# Patient Record
Sex: MALE | Race: WHITE | NOT HISPANIC OR LATINO | Employment: FULL TIME | URBAN - METROPOLITAN AREA
[De-identification: names, ages, dates, MRNs, and addresses within clinical notes are randomized per-mention and may not be internally consistent; named-entity substitution may affect disease eponyms.]

---

## 2023-05-12 LAB
ALANINE AMINOTRANSFERASE (SGPT) (U/L) IN SER/PLAS: 16 U/L (ref 10–52)
ALBUMIN (G/DL) IN SER/PLAS: 3.9 G/DL (ref 3.4–5)
ALKALINE PHOSPHATASE (U/L) IN SER/PLAS: 110 U/L (ref 33–120)
AMYLASE (U/L) IN SER/PLAS: 23 U/L (ref 29–103)
ANION GAP IN SER/PLAS: 13 MMOL/L (ref 10–20)
ASPARTATE AMINOTRANSFERASE (SGOT) (U/L) IN SER/PLAS: 19 U/L (ref 9–39)
BASOPHILS (10*3/UL) IN BLOOD BY AUTOMATED COUNT: 0.05 X10E9/L (ref 0–0.1)
BASOPHILS/100 LEUKOCYTES IN BLOOD BY AUTOMATED COUNT: 0.4 % (ref 0–2)
BILIRUBIN TOTAL (MG/DL) IN SER/PLAS: 0.7 MG/DL (ref 0–1.2)
CALCIDIOL (25 OH VITAMIN D3) (NG/ML) IN SER/PLAS: 30 NG/ML
CALCIUM (MG/DL) IN SER/PLAS: 9.2 MG/DL (ref 8.6–10.3)
CARBON DIOXIDE, TOTAL (MMOL/L) IN SER/PLAS: 29 MMOL/L (ref 21–32)
CHLORIDE (MMOL/L) IN SER/PLAS: 103 MMOL/L (ref 98–107)
CHOLESTEROL (MG/DL) IN SER/PLAS: 141 MG/DL (ref 0–199)
CHOLESTEROL IN HDL (MG/DL) IN SER/PLAS: 49.7 MG/DL
CHOLESTEROL/HDL RATIO: 2.8
CREATININE (MG/DL) IN SER/PLAS: 0.77 MG/DL (ref 0.5–1.3)
EOSINOPHILS (10*3/UL) IN BLOOD BY AUTOMATED COUNT: 0.33 X10E9/L (ref 0–0.7)
EOSINOPHILS/100 LEUKOCYTES IN BLOOD BY AUTOMATED COUNT: 2.9 % (ref 0–6)
ERYTHROCYTE DISTRIBUTION WIDTH (RATIO) BY AUTOMATED COUNT: 13.6 % (ref 11.5–14.5)
ERYTHROCYTE MEAN CORPUSCULAR HEMOGLOBIN CONCENTRATION (G/DL) BY AUTOMATED: 33.7 G/DL (ref 32–36)
ERYTHROCYTE MEAN CORPUSCULAR VOLUME (FL) BY AUTOMATED COUNT: 90 FL (ref 80–100)
ERYTHROCYTES (10*6/UL) IN BLOOD BY AUTOMATED COUNT: 5.22 X10E12/L (ref 4.5–5.9)
ESTIMATED AVERAGE GLUCOSE FOR HBA1C: 117 MG/DL
FERRITIN (UG/LL) IN SER/PLAS: 334 UG/L (ref 20–300)
GFR MALE: >90 ML/MIN/1.73M2
GLUCOSE (MG/DL) IN SER/PLAS: 104 MG/DL (ref 74–99)
HEMATOCRIT (%) IN BLOOD BY AUTOMATED COUNT: 46.9 % (ref 41–52)
HEMOGLOBIN (G/DL) IN BLOOD: 15.8 G/DL (ref 13.5–17.5)
HEMOGLOBIN A1C/HEMOGLOBIN TOTAL IN BLOOD: 5.7 %
IMMATURE GRANULOCYTES/100 LEUKOCYTES IN BLOOD BY AUTOMATED COUNT: 0.4 % (ref 0–0.9)
IRON (UG/DL) IN SER/PLAS: 132 UG/DL (ref 35–150)
IRON BINDING CAPACITY (UG/DL) IN SER/PLAS: 291 UG/DL (ref 240–445)
IRON SATURATION (%) IN SER/PLAS: 45 % (ref 25–45)
LDL: 74 MG/DL (ref 0–99)
LEUKOCYTES (10*3/UL) IN BLOOD BY AUTOMATED COUNT: 11.3 X10E9/L (ref 4.4–11.3)
LYMPHOCYTES (10*3/UL) IN BLOOD BY AUTOMATED COUNT: 2.92 X10E9/L (ref 1.2–4.8)
LYMPHOCYTES/100 LEUKOCYTES IN BLOOD BY AUTOMATED COUNT: 25.7 % (ref 13–44)
MONOCYTES (10*3/UL) IN BLOOD BY AUTOMATED COUNT: 0.66 X10E9/L (ref 0.1–1)
MONOCYTES/100 LEUKOCYTES IN BLOOD BY AUTOMATED COUNT: 5.8 % (ref 2–10)
NEUTROPHILS (10*3/UL) IN BLOOD BY AUTOMATED COUNT: 7.34 X10E9/L (ref 1.2–7.7)
NEUTROPHILS/100 LEUKOCYTES IN BLOOD BY AUTOMATED COUNT: 64.8 % (ref 40–80)
PLATELETS (10*3/UL) IN BLOOD AUTOMATED COUNT: 249 X10E9/L (ref 150–450)
POTASSIUM (MMOL/L) IN SER/PLAS: 4.6 MMOL/L (ref 3.5–5.3)
PROSTATE SPECIFIC ANTIGEN,SCREEN: 6.85 NG/ML (ref 0–4)
PROTEIN TOTAL: 6.9 G/DL (ref 6.4–8.2)
SODIUM (MMOL/L) IN SER/PLAS: 140 MMOL/L (ref 136–145)
TRIGLYCERIDE (MG/DL) IN SER/PLAS: 89 MG/DL (ref 0–149)
UREA NITROGEN (MG/DL) IN SER/PLAS: 10 MG/DL (ref 6–23)
VLDL: 18 MG/DL (ref 0–40)

## 2023-05-16 PROBLEM — I10 HYPERTENSION: Status: ACTIVE | Noted: 2023-05-16

## 2023-05-16 PROBLEM — M54.16 LUMBAR RADICULAR PAIN: Status: ACTIVE | Noted: 2023-05-16

## 2023-05-16 PROBLEM — E11.9 TYPE 2 DIABETES MELLITUS (MULTI): Status: ACTIVE | Noted: 2023-05-16

## 2023-05-16 PROBLEM — E11.9 DIABETES (MULTI): Status: ACTIVE | Noted: 2023-05-16

## 2023-05-16 PROBLEM — I87.2 CHRONIC VENOUS STASIS DERMATITIS: Status: ACTIVE | Noted: 2023-05-16

## 2023-05-16 PROBLEM — I20.9 ANGINA PECTORIS (CMS-HCC): Status: ACTIVE | Noted: 2023-05-16

## 2023-05-16 PROBLEM — M54.50 LOW BACK PAIN: Status: ACTIVE | Noted: 2023-05-16

## 2023-05-16 PROBLEM — E78.5 HYPERLIPEMIA: Status: ACTIVE | Noted: 2023-05-16

## 2023-05-16 PROBLEM — J44.9 CHRONIC OBSTRUCTIVE PULMONARY DISEASE (MULTI): Status: ACTIVE | Noted: 2023-05-16

## 2023-05-16 PROBLEM — N52.9 MALE ERECTILE DISORDER OF ORGANIC ORIGIN: Status: ACTIVE | Noted: 2023-05-16

## 2023-05-16 PROBLEM — G47.33 OSA ON CPAP: Status: ACTIVE | Noted: 2023-05-16

## 2023-05-16 PROBLEM — E55.9 VITAMIN D DEFICIENCY: Status: ACTIVE | Noted: 2023-05-16

## 2023-05-16 PROBLEM — R97.20 ELEVATED PSA: Status: ACTIVE | Noted: 2023-05-16

## 2023-05-16 LAB — VITAMIN B1, WHOLE BLOOD: 241 NMOL/L (ref 70–180)

## 2023-05-16 RX ORDER — ATORVASTATIN CALCIUM 20 MG/1
1 TABLET, FILM COATED ORAL DAILY
COMMUNITY
End: 2023-05-17 | Stop reason: SDUPTHER

## 2023-05-16 RX ORDER — LISINOPRIL 40 MG/1
1 TABLET ORAL DAILY
COMMUNITY
Start: 2022-02-28 | End: 2023-05-17 | Stop reason: SDUPTHER

## 2023-05-16 RX ORDER — MULTIVITAMIN
TABLET ORAL
COMMUNITY

## 2023-05-16 RX ORDER — SILDENAFIL 50 MG/1
TABLET, FILM COATED ORAL
COMMUNITY
Start: 2019-10-30 | End: 2023-05-17 | Stop reason: SDUPTHER

## 2023-05-16 RX ORDER — PANTOPRAZOLE SODIUM 40 MG/1
TABLET, DELAYED RELEASE ORAL
Status: ON HOLD | COMMUNITY
End: 2023-10-10 | Stop reason: ALTCHOICE

## 2023-05-16 RX ORDER — BECLOMETHASONE DIPROPIONATE HFA 80 UG/1
AEROSOL, METERED RESPIRATORY (INHALATION)
Status: ON HOLD | COMMUNITY
Start: 2022-09-14 | End: 2023-10-10 | Stop reason: ALTCHOICE

## 2023-05-16 RX ORDER — ALBUTEROL SULFATE 90 UG/1
2 AEROSOL, METERED RESPIRATORY (INHALATION) EVERY 4 HOURS PRN
Status: ON HOLD | COMMUNITY
Start: 2022-09-14 | End: 2023-10-10 | Stop reason: ALTCHOICE

## 2023-05-16 RX ORDER — FLUOCINONIDE 0.5 MG/G
OINTMENT TOPICAL 2 TIMES DAILY
Status: ON HOLD | COMMUNITY
Start: 2022-04-27 | End: 2023-10-10 | Stop reason: ALTCHOICE

## 2023-05-17 ENCOUNTER — OFFICE VISIT (OUTPATIENT)
Dept: PRIMARY CARE | Facility: CLINIC | Age: 53
End: 2023-05-17
Payer: COMMERCIAL

## 2023-05-17 VITALS
RESPIRATION RATE: 16 BRPM | HEIGHT: 72 IN | SYSTOLIC BLOOD PRESSURE: 144 MMHG | DIASTOLIC BLOOD PRESSURE: 84 MMHG | HEART RATE: 78 BPM | TEMPERATURE: 98 F | WEIGHT: 304 LBS | BODY MASS INDEX: 41.17 KG/M2

## 2023-05-17 DIAGNOSIS — E78.2 MIXED HYPERLIPIDEMIA: Primary | ICD-10-CM

## 2023-05-17 DIAGNOSIS — G47.33 OSA ON CPAP: ICD-10-CM

## 2023-05-17 DIAGNOSIS — E55.9 VITAMIN D DEFICIENCY: ICD-10-CM

## 2023-05-17 DIAGNOSIS — R97.20 ELEVATED PSA: ICD-10-CM

## 2023-05-17 DIAGNOSIS — I10 PRIMARY HYPERTENSION: ICD-10-CM

## 2023-05-17 DIAGNOSIS — N52.9 ERECTILE DYSFUNCTION, UNSPECIFIED ERECTILE DYSFUNCTION TYPE: ICD-10-CM

## 2023-05-17 DIAGNOSIS — E11.69 TYPE 2 DIABETES MELLITUS WITH OTHER SPECIFIED COMPLICATION, UNSPECIFIED WHETHER LONG TERM INSULIN USE (MULTI): ICD-10-CM

## 2023-05-17 PROCEDURE — 3044F HG A1C LEVEL LT 7.0%: CPT | Performed by: FAMILY MEDICINE

## 2023-05-17 PROCEDURE — 4010F ACE/ARB THERAPY RXD/TAKEN: CPT | Performed by: FAMILY MEDICINE

## 2023-05-17 PROCEDURE — 3077F SYST BP >= 140 MM HG: CPT | Performed by: FAMILY MEDICINE

## 2023-05-17 PROCEDURE — 99214 OFFICE O/P EST MOD 30 MIN: CPT | Performed by: FAMILY MEDICINE

## 2023-05-17 PROCEDURE — 1036F TOBACCO NON-USER: CPT | Performed by: FAMILY MEDICINE

## 2023-05-17 PROCEDURE — 3079F DIAST BP 80-89 MM HG: CPT | Performed by: FAMILY MEDICINE

## 2023-05-17 RX ORDER — ATORVASTATIN CALCIUM 20 MG/1
20 TABLET, FILM COATED ORAL DAILY
Qty: 90 TABLET | Refills: 3 | Status: SHIPPED | OUTPATIENT
Start: 2023-05-17 | End: 2024-01-19 | Stop reason: SDUPTHER

## 2023-05-17 RX ORDER — SILDENAFIL 50 MG/1
50 TABLET, FILM COATED ORAL
Qty: 10 TABLET | Refills: 3 | Status: SHIPPED | OUTPATIENT
Start: 2023-05-17 | End: 2023-10-10 | Stop reason: HOSPADM

## 2023-05-17 RX ORDER — LISINOPRIL 40 MG/1
40 TABLET ORAL DAILY
Qty: 90 TABLET | Refills: 3 | Status: SHIPPED | OUTPATIENT
Start: 2023-05-17 | End: 2024-01-19 | Stop reason: SDUPTHER

## 2023-05-17 RX ORDER — HYDROCHLOROTHIAZIDE 12.5 MG/1
12.5 TABLET ORAL DAILY
Qty: 90 TABLET | Refills: 3 | Status: SHIPPED | OUTPATIENT
Start: 2023-05-17 | End: 2024-01-19 | Stop reason: SDUPTHER

## 2023-05-17 ASSESSMENT — PATIENT HEALTH QUESTIONNAIRE - PHQ9
1. LITTLE INTEREST OR PLEASURE IN DOING THINGS: NOT AT ALL
2. FEELING DOWN, DEPRESSED OR HOPELESS: NOT AT ALL
SUM OF ALL RESPONSES TO PHQ9 QUESTIONS 1 AND 2: 0

## 2023-05-17 ASSESSMENT — ENCOUNTER SYMPTOMS: HYPERTENSION: 1

## 2023-05-17 NOTE — PROGRESS NOTES
Iglesia Rojo is a 53 y.o. male here today for   Chief Complaint   Patient presents with    Hypertension    Hyperlipidemia   BP at home 5/7-5/16: 142-164/79-95     Hypertension  This is a chronic problem. The current episode started more than 1 year ago. The problem has been gradually worsening since onset.   Hyperlipidemia  This is a chronic problem. The current episode started more than 1 year ago.          Objective    Visit Vitals  /84   Pulse 78   Temp 36.7 °C (98 °F)   Resp 16     Body mass index is 41.23 kg/m².   Lab Results   Component Value Date    GLUCOSE 104 (H) 05/12/2023    CALCIUM 9.2 05/12/2023     05/12/2023    K 4.6 05/12/2023    CO2 29 05/12/2023     05/12/2023    BUN 10 05/12/2023    CREATININE 0.77 05/12/2023      Lab Results   Component Value Date    WBC 11.3 05/12/2023    HGB 15.8 05/12/2023    HCT 46.9 05/12/2023    MCV 90 05/12/2023     05/12/2023        Physical Exam  Vitals reviewed.   Constitutional:       General: He is not in acute distress.     Appearance: Normal appearance.   HENT:      Head: Normocephalic and atraumatic.   Cardiovascular:      Rate and Rhythm: Normal rate and regular rhythm.      Heart sounds: No murmur heard.  Pulmonary:      Effort: Pulmonary effort is normal.      Breath sounds: No wheezing, rhonchi or rales.   Musculoskeletal:      Right lower leg: No edema.      Left lower leg: No edema.   Neurological:      Mental Status: He is alert.   Psychiatric:         Mood and Affect: Mood normal.         Behavior: Behavior normal.            Assessment    1. Mixed hyperlipidemia  atorvastatin (Lipitor) 20 mg tablet      2. GOLDY on CPAP        3. Primary hypertension  hydroCHLOROthiazide (HYDRODiuril) 12.5 mg tablet, lisinopril 40 mg tablet      4. Type 2 diabetes mellitus with other specified complication, unspecified whether long term insulin use (CMS/[facility])        5. Vitamin D deficiency        6. Elevated PSA  Referral to Urology      7.  Erectile dysfunction, unspecified erectile dysfunction type  sildenafil (Viagra) 50 mg tablet         Added hydrochlorothiazide  Patient advised to follow up in 6 moas needed or if any concerns arise.  Medication risks and benefits discussed.

## 2023-08-21 LAB
PROSTATE SPECIFIC AG (NG/ML) IN SER/PLAS: 5.9 NG/ML (ref 0–4)
PROSTATE SPECIFIC AG FREE (NG/ML) IN SER/PLAS: 0.5 NG/ML
PROSTATE SPECIFIC AG FREE/PROSTATE SPECIFIC AG TOTAL IN SER/PLAS: 8 %

## 2023-10-08 ENCOUNTER — PREP FOR PROCEDURE (OUTPATIENT)
Dept: OPERATING ROOM | Facility: CLINIC | Age: 53
End: 2023-10-08
Payer: COMMERCIAL

## 2023-10-08 DIAGNOSIS — R97.20 ELEVATED PSA: Primary | ICD-10-CM

## 2023-10-08 RX ORDER — CEFAZOLIN SODIUM 2 G/100ML
2 INJECTION, SOLUTION INTRAVENOUS EVERY 8 HOURS
Status: CANCELLED | OUTPATIENT
Start: 2023-10-08

## 2023-10-08 RX ORDER — SODIUM CHLORIDE 9 MG/ML
100 INJECTION, SOLUTION INTRAVENOUS CONTINUOUS
Status: CANCELLED | OUTPATIENT
Start: 2023-10-08

## 2023-10-10 ENCOUNTER — HOSPITAL ENCOUNTER (OUTPATIENT)
Facility: CLINIC | Age: 53
Setting detail: OUTPATIENT SURGERY
Discharge: HOME | End: 2023-10-10
Attending: OPHTHALMOLOGY | Admitting: OPHTHALMOLOGY
Payer: COMMERCIAL

## 2023-10-10 ENCOUNTER — ANESTHESIA EVENT (OUTPATIENT)
Dept: OPERATING ROOM | Facility: CLINIC | Age: 53
End: 2023-10-10
Payer: COMMERCIAL

## 2023-10-10 ENCOUNTER — ANESTHESIA (OUTPATIENT)
Dept: OPERATING ROOM | Facility: CLINIC | Age: 53
End: 2023-10-10
Payer: COMMERCIAL

## 2023-10-10 VITALS
SYSTOLIC BLOOD PRESSURE: 136 MMHG | BODY MASS INDEX: 39.83 KG/M2 | RESPIRATION RATE: 16 BRPM | HEART RATE: 66 BPM | TEMPERATURE: 96.8 F | HEIGHT: 72 IN | DIASTOLIC BLOOD PRESSURE: 62 MMHG | OXYGEN SATURATION: 100 % | WEIGHT: 294.09 LBS

## 2023-10-10 DIAGNOSIS — R97.20 ELEVATED PROSTATE SPECIFIC ANTIGEN (PSA): ICD-10-CM

## 2023-10-10 DIAGNOSIS — R97.20 ELEVATED PSA: ICD-10-CM

## 2023-10-10 PROBLEM — K21.9 GASTROESOPHAGEAL REFLUX DISEASE WITHOUT ESOPHAGITIS: Status: ACTIVE | Noted: 2023-10-10

## 2023-10-10 PROCEDURE — 2580000001 HC RX 258 IV SOLUTIONS

## 2023-10-10 PROCEDURE — 3700000001 HC GENERAL ANESTHESIA TIME - INITIAL BASE CHARGE: Performed by: UROLOGY

## 2023-10-10 PROCEDURE — 2500000004 HC RX 250 GENERAL PHARMACY W/ HCPCS (ALT 636 FOR OP/ED)

## 2023-10-10 PROCEDURE — 88344 IMHCHEM/IMCYTCHM EA MLT ANTB: CPT | Mod: TC,ELYLAB | Performed by: UROLOGY

## 2023-10-10 PROCEDURE — 88305 TISSUE EXAM BY PATHOLOGIST: CPT | Mod: TC,SUR,CMCLAB,ELYLAB | Performed by: UROLOGY

## 2023-10-10 PROCEDURE — 88344 IMHCHEM/IMCYTCHM EA MLT ANTB: CPT | Performed by: PATHOLOGY

## 2023-10-10 PROCEDURE — 88305 TISSUE EXAM BY PATHOLOGIST: CPT | Mod: TC,ELYLAB | Performed by: UROLOGY

## 2023-10-10 PROCEDURE — 3600000007 HC OR TIME - EACH INCREMENTAL 1 MINUTE - PROCEDURE LEVEL TWO: Performed by: UROLOGY

## 2023-10-10 PROCEDURE — 3700000002 HC GENERAL ANESTHESIA TIME - EACH INCREMENTAL 1 MINUTE: Performed by: UROLOGY

## 2023-10-10 PROCEDURE — 3600000002 HC OR TIME - INITIAL BASE CHARGE - PROCEDURE LEVEL TWO: Performed by: UROLOGY

## 2023-10-10 PROCEDURE — 7100000009 HC PHASE TWO TIME - INITIAL BASE CHARGE: Performed by: UROLOGY

## 2023-10-10 PROCEDURE — 2500000005 HC RX 250 GENERAL PHARMACY W/O HCPCS: Performed by: UROLOGY

## 2023-10-10 PROCEDURE — A55700 PR BIOPSY OF PROSTATE,NEEDLE/PUNCH

## 2023-10-10 PROCEDURE — A55700 PR BIOPSY OF PROSTATE,NEEDLE/PUNCH: Performed by: ANESTHESIOLOGY

## 2023-10-10 PROCEDURE — 55700 PR PROSTATE NEEDLE BIOPSY ANY APPROACH: CPT | Performed by: UROLOGY

## 2023-10-10 PROCEDURE — 76942 ECHO GUIDE FOR BIOPSY: CPT | Performed by: UROLOGY

## 2023-10-10 PROCEDURE — 7100000010 HC PHASE TWO TIME - EACH INCREMENTAL 1 MINUTE: Performed by: UROLOGY

## 2023-10-10 PROCEDURE — 88305 TISSUE EXAM BY PATHOLOGIST: CPT | Mod: TC,CMCLAB,ELYLAB | Performed by: UROLOGY

## 2023-10-10 PROCEDURE — G0416 PROSTATE BIOPSY, ANY MTHD: HCPCS | Performed by: PATHOLOGY

## 2023-10-10 RX ORDER — KETOROLAC TROMETHAMINE 30 MG/ML
INJECTION, SOLUTION INTRAMUSCULAR; INTRAVENOUS AS NEEDED
Status: DISCONTINUED | OUTPATIENT
Start: 2023-10-10 | End: 2023-10-10

## 2023-10-10 RX ORDER — FENTANYL CITRATE 50 UG/ML
INJECTION, SOLUTION INTRAMUSCULAR; INTRAVENOUS AS NEEDED
Status: DISCONTINUED | OUTPATIENT
Start: 2023-10-10 | End: 2023-10-10

## 2023-10-10 RX ORDER — PROPOFOL 10 MG/ML
INJECTION, EMULSION INTRAVENOUS AS NEEDED
Status: DISCONTINUED | OUTPATIENT
Start: 2023-10-10 | End: 2023-10-10

## 2023-10-10 RX ORDER — MIDAZOLAM HYDROCHLORIDE 1 MG/ML
INJECTION, SOLUTION INTRAMUSCULAR; INTRAVENOUS AS NEEDED
Status: DISCONTINUED | OUTPATIENT
Start: 2023-10-10 | End: 2023-10-10

## 2023-10-10 RX ORDER — ACETAMINOPHEN 325 MG/1
TABLET ORAL AS NEEDED
Status: DISCONTINUED | OUTPATIENT
Start: 2023-10-10 | End: 2023-10-10

## 2023-10-10 RX ORDER — CEFAZOLIN SODIUM 2 G/100ML
2 INJECTION, SOLUTION INTRAVENOUS EVERY 8 HOURS
Status: DISCONTINUED | OUTPATIENT
Start: 2023-10-10 | End: 2023-10-10 | Stop reason: HOSPADM

## 2023-10-10 RX ORDER — SODIUM CHLORIDE, SODIUM LACTATE, POTASSIUM CHLORIDE, CALCIUM CHLORIDE 600; 310; 30; 20 MG/100ML; MG/100ML; MG/100ML; MG/100ML
100 INJECTION, SOLUTION INTRAVENOUS CONTINUOUS
Status: DISCONTINUED | OUTPATIENT
Start: 2023-10-10 | End: 2023-10-10 | Stop reason: HOSPADM

## 2023-10-10 RX ORDER — LIDOCAINE HYDROCHLORIDE 10 MG/ML
0.1 INJECTION, SOLUTION EPIDURAL; INFILTRATION; INTRACAUDAL; PERINEURAL ONCE
Status: DISCONTINUED | OUTPATIENT
Start: 2023-10-10 | End: 2023-10-10 | Stop reason: HOSPADM

## 2023-10-10 RX ORDER — CEFAZOLIN 1 G/1
INJECTION, POWDER, FOR SOLUTION INTRAVENOUS AS NEEDED
Status: DISCONTINUED | OUTPATIENT
Start: 2023-10-10 | End: 2023-10-10

## 2023-10-10 RX ORDER — BUPIVACAINE HYDROCHLORIDE 7.5 MG/ML
INJECTION, SOLUTION EPIDURAL; RETROBULBAR AS NEEDED
Status: DISCONTINUED | OUTPATIENT
Start: 2023-10-10 | End: 2023-10-10 | Stop reason: HOSPADM

## 2023-10-10 RX ORDER — ONDANSETRON HYDROCHLORIDE 2 MG/ML
4 INJECTION, SOLUTION INTRAVENOUS ONCE AS NEEDED
Status: DISCONTINUED | OUTPATIENT
Start: 2023-10-10 | End: 2023-10-10 | Stop reason: HOSPADM

## 2023-10-10 RX ORDER — ONDANSETRON HYDROCHLORIDE 2 MG/ML
INJECTION, SOLUTION INTRAVENOUS AS NEEDED
Status: DISCONTINUED | OUTPATIENT
Start: 2023-10-10 | End: 2023-10-10

## 2023-10-10 RX ORDER — PROPOFOL 10 MG/ML
INJECTION, EMULSION INTRAVENOUS CONTINUOUS PRN
Status: DISCONTINUED | OUTPATIENT
Start: 2023-10-10 | End: 2023-10-10

## 2023-10-10 RX ADMIN — CEFAZOLIN 3 G: 330 INJECTION, POWDER, FOR SOLUTION INTRAMUSCULAR; INTRAVENOUS at 11:32

## 2023-10-10 RX ADMIN — ACETAMINOPHEN 975 MG: 325 TABLET ORAL at 10:36

## 2023-10-10 RX ADMIN — FENTANYL CITRATE 50 MCG: 50 INJECTION, SOLUTION INTRAMUSCULAR; INTRAVENOUS at 11:36

## 2023-10-10 RX ADMIN — MIDAZOLAM 2 MG: 1 INJECTION INTRAMUSCULAR; INTRAVENOUS at 11:22

## 2023-10-10 RX ADMIN — SODIUM CHLORIDE, POTASSIUM CHLORIDE, SODIUM LACTATE AND CALCIUM CHLORIDE: 600; 310; 30; 20 INJECTION, SOLUTION INTRAVENOUS at 11:22

## 2023-10-10 RX ADMIN — FENTANYL CITRATE 50 MCG: 50 INJECTION, SOLUTION INTRAMUSCULAR; INTRAVENOUS at 11:29

## 2023-10-10 RX ADMIN — PROPOFOL 50 MG: 10 INJECTION, EMULSION INTRAVENOUS at 11:29

## 2023-10-10 RX ADMIN — ONDANSETRON 4 MG: 2 INJECTION, SOLUTION INTRAMUSCULAR; INTRAVENOUS at 11:46

## 2023-10-10 RX ADMIN — PROPOFOL 20 MG: 10 INJECTION, EMULSION INTRAVENOUS at 11:35

## 2023-10-10 RX ADMIN — PROPOFOL 100 MCG/KG/MIN: 10 INJECTION, EMULSION INTRAVENOUS at 11:29

## 2023-10-10 RX ADMIN — KETOROLAC TROMETHAMINE 30 MG: 30 INJECTION, SOLUTION INTRAMUSCULAR; INTRAVENOUS at 11:46

## 2023-10-10 SDOH — HEALTH STABILITY: MENTAL HEALTH: CURRENT SMOKER: 0

## 2023-10-10 ASSESSMENT — PAIN SCALES - GENERAL
PAINLEVEL_OUTOF10: 0 - NO PAIN

## 2023-10-10 ASSESSMENT — COLUMBIA-SUICIDE SEVERITY RATING SCALE - C-SSRS
6. HAVE YOU EVER DONE ANYTHING, STARTED TO DO ANYTHING, OR PREPARED TO DO ANYTHING TO END YOUR LIFE?: NO
1. IN THE PAST MONTH, HAVE YOU WISHED YOU WERE DEAD OR WISHED YOU COULD GO TO SLEEP AND NOT WAKE UP?: NO
2. HAVE YOU ACTUALLY HAD ANY THOUGHTS OF KILLING YOURSELF?: NO

## 2023-10-10 ASSESSMENT — PAIN - FUNCTIONAL ASSESSMENT
PAIN_FUNCTIONAL_ASSESSMENT: 0-10

## 2023-10-10 NOTE — H&P
History Of Present Illness  Pt has elevated PSA and concerning prostate MRI, here for fusion biopsy    Past Medical History  Past Medical History:   Diagnosis Date    Hyperlipidemia     Hypertension     Other conditions influencing health status     Diabetes mellitus with neurological manifestations, uncontrolled    Personal history of other endocrine, nutritional and metabolic disease     History of hyperlipidemia    Personal history of other endocrine, nutritional and metabolic disease     History of morbid obesity    Personal history of other endocrine, nutritional and metabolic disease 11/21/2022    History of type 2 diabetes mellitus    Personal history of other endocrine, nutritional and metabolic disease 12/07/2021    History of morbid obesity        Surgical History  Past Surgical History:   Procedure Laterality Date    GASTRECTOMY      VERTICAL SLEEVE        Social History  He reports that he has quit smoking. His smoking use included cigarettes. He has never used smokeless tobacco. He reports current alcohol use. He reports that he does not use drugs.    Family History  No family history on file.     Allergies  Patient has no known allergies.    ROS: 12 system review was completed and is negative with the exception of those signs and symptoms noted in the history of present illness: A 12 system review was completed and is negative with the exception of those signs and symptoms noted in the history of present illness.     Exam:  General: in NAD, appears stated age  Head: normocephalic, atraumatic  Respiratory: normal effort, no use of accessory muscles  Cardiovascular: no edema noted  Skin: normal turgor, no rashes  Neurologic: grossly intact, oriented to person/place/time  Psychiatric: mode and affect appropriate    Per anesthesiology, clear to auscultation bilaterally with a regular rate and rhythm     Last Recorded Vitals  /77   Pulse 102   Temp 36.2 °C (97.2 °F) (Tympanic)   Resp 24   Ht 1.829  "m (6')   Wt 133 kg (294 lb 1.5 oz)   SpO2 98%   BMI 39.89 kg/m²       No results found for: \"URINECULTURE\", \"CREATININE\"      ASSESSMENT/PLAN:  Okay to proceed to OR for biopsy of prostate    Enrike Mcgill MD    "

## 2023-10-10 NOTE — ANESTHESIA POSTPROCEDURE EVALUATION
Patient: Iglesia Rojo    Procedure Summary       Date: 10/10/23 Room / Location: [facility] OR 03 / [facility]    Anesthesia Start: 1122 Anesthesia Stop: 1155    Procedure: TRANSPERINEAL PROSTATE BIOPSY WITH URO-NAVIGATED FUSION Diagnosis:       Elevated prostate specific antigen (PSA)      (Elevated prostate specific antigen (PSA) [R97.20])    Surgeons: Enrike Mcgill MD Responsible Provider: Stewart Vega MD    Anesthesia Type: MAC ASA Status: 3            Anesthesia Type: MAC    Vitals Value Taken Time   BP 96/51 10/10/23 1208   Temp 36 °C (96.8 °F) 10/10/23 1152   Pulse 74 10/10/23 1208   Resp 16 10/10/23 1208   SpO2 97 % 10/10/23 1208       Anesthesia Post Evaluation    Patient location during evaluation: bedside  Patient participation: complete - patient participated  Level of consciousness: awake  Pain management: adequate  Cardiovascular status: acceptable  Respiratory status: acceptable  Comments: Did well        No notable events documented.

## 2023-10-10 NOTE — ANESTHESIA PREPROCEDURE EVALUATION
Patient: Iglesia Rojo    Procedure Information       Date/Time: 10/10/23 1110    Procedure: TRANSPERINEAL PROSTATE BIOPSY WITH URO-NAVIGATED FUSION - NEEDS: FORTEC PRECISION POINT, TRANSPERINEAL PROBE, ULTRASOUND MACHINE,URONAV, ANCEF 2GM IV    Location: [facility] [place] [facility] OR 03 / [facility] [place] [facility]    Surgeons: Enrike Mcgill MD            Relevant Problems   Anesthesia (within normal limits)      Cardiovascular   (+) Angina pectoris (CMS/HCC)   (+) Hyperlipemia   (+) Hypertension      Endocrine   (+) Type 2 diabetes mellitus (CMS/HCC)      GI   (+) Gastroesophageal reflux disease without esophagitis      /Renal (within normal limits)      Neuro/Psych (within normal limits)      Pulmonary   (+) Chronic obstructive pulmonary disease (CMS/HCC)   (+) GOLDY on CPAP      GI/Hepatic (within normal limits)      Hematology (within normal limits)      Musculoskeletal (within normal limits)       Clinical information reviewed:   Tobacco  Allergies  Meds   Med Hx  Surg Hx   Fam Hx  Soc Hx        NPO Detail:  NPO/Void Status  Date of Last Liquid: 10/10/23  Time of Last Liquid: 0000  Date of Last Solid: 10/10/23  Time of Last Solid: 0000         Physical Exam    Airway  Mallampati: III  TM distance: >3 FB     Cardiovascular - normal exam     Dental        Pulmonary - normal exam     Abdominal - normal exam             Anesthesia Plan    ASA 3     MAC     The patient is not a current smoker.    intravenous induction   Anesthetic plan and risks discussed with patient.    Plan discussed with resident and attending.

## 2023-10-10 NOTE — OP NOTE
TRANSPERINEAL PROSTATE BIOPSY WITH URO-NAVIGATED FUSION Operative Note     Date: 10/10/2023  OR Location: [facility] OR    Name: Iglesia Rojo, : 1970, Age: 53 y.o., MRN: [id number], Sex: male    Diagnosis  Pre-op Diagnosis     * Elevated prostate specific antigen (PSA) [R97.20] Post-op Diagnosis     * Elevated prostate specific antigen (PSA) [R97.20]     Procedures  TRANSPERINEAL PROSTATE BIOPSY WITH URO-NAVIGATED FUSION  64024 - MO PROSTATE NEEDLE BIOPSY ANY APPROACH      Surgeons      * Enrike Mcgill - Primary    Resident/Fellow/Other Assistant:  No surgical staff documented.    Procedure Summary  Anesthesia: Monitor Anesthesia Care  ASA: III  Anesthesia Staff: Anesthesiologist: Stewart Vega MD  CRNA: LUIS Ordaz-CRNA  Estimated Blood Loss: 3mL  Intra-op Medications:   Medication Name Total Dose   bupivacaine PF (Marcaine) injection 0.75 % 30 mL              Anesthesia Record               Intraprocedure I/O Totals          Intake    Propofol Drip 0.00 mL    The total shown is the total volume documented since Anesthesia Start was filed.    Total Intake 0 mL          Specimen:   ID Type Source Tests Collected by Time   1 : RIGHT POSTERIOR MEDIAL Tissue PROSTATE NEEDLE BIOPSY RIGHT SURGICAL PATHOLOGY EXAM Enrike Mcgill MD 10/10/2023 1126   2 : RIGHT POSTERIOR LATERAL Tissue PROSTATE NEEDLE BIOPSY RIGHT SURGICAL PATHOLOGY EXAM Enrike Mcgill MD 10/10/2023 1127   3 : RIGHT BASE Tissue PROSTATE NEEDLE BIOPSY RIGHT SURGICAL PATHOLOGY EXAM Enrike Mcgill MD 10/10/2023 1127   4 : RIGHT ANTERIOR Tissue PROSTATE NEEDLE BIOPSY RIGHT SURGICAL PATHOLOGY EXAM Enrike Mcgill MD 10/10/2023 1127   5 : LEFT POSTERIOR MEDIAL Tissue PROSTATE NEEDLE BIOPSY LEFT SURGICAL PATHOLOGY EXAM Enrike Mcgill MD 10/10/2023 1128   6 : LEFT POSTERIOR LATERAL Tissue PROSTATE NEEDLE BIOPSY LEFT SURGICAL PATHOLOGY EXAM Enrike Mcgill MD 10/10/2023 1128   7 : LEFT BASE Tissue PROSTATE NEEDLE BIOPSY LEFT  SURGICAL PATHOLOGY EXAM Enrike Mcgill MD 10/10/2023 1128   8 : LEFT ANTERIOR Tissue PROSTATE NEEDLE BIOPSY LEFT SURGICAL PATHOLOGY EXAM Enrike Mcgill MD 10/10/2023 1128   9 : RIGHT POSTERIOR TARGET Tissue PROSTATE NEEDLE BIOPSY RIGHT SURGICAL PATHOLOGY EXAM Enrike Mcgill MD 10/10/2023 1143        Staff:   Circulator: Adam Mauricio, RN         Drains and/or Catheters: * None in log *    Tourniquet Times:         Implants:     Findings: Abnormal digital rectal exam, entire right side of prostate indurated.  Uncomplicated transperineal fusion biopsy.    Indications: Iglesia Rojo is an 53 y.o. male who is having surgery for Elevated prostate specific antigen (PSA) [R97.20].  He had an MRI of the prostate which revealed a PI-RADS 5 lesion along the right posterior lateral peripheral zone.  He presents today for transperineal prostate biopsy  PSA 5.9, 8% free  MRI 23.3 g prostate, PSA density 0.25 PI-RADS 5 lesion right posterior lateral peripheral zone with bulging of the prostatic capsule    The patient was seen in the preoperative area. The risks, benefits, complications, treatment options, non-operative alternatives, expected recovery and outcomes were discussed with the patient. The possibilities of reaction to medication, pulmonary aspiration, injury to surrounding structures, bleeding, recurrent infection, the need for additional procedures, failure to diagnose a condition, and creating a complication requiring transfusion or operation were discussed with the patient. The patient concurred with the proposed plan, giving informed consent.  The site of surgery was properly noted/marked if necessary per policy. The patient has been actively warmed in preoperative area. Preoperative antibiotics have been ordered and given within 1 hours of incision. Venous thrombosis prophylaxis are not indicated.    Procedure Details:   Operative Procedure:   The patient was correctly identified and the operative plan was  confirmed with the patient and the operative team. A weight appropriate dose of prophylactic antibiotics was administered intravenously prior to the procedure and sequential compression devices were applied to the lower extremities and activated prior to induction of anesthesia. The patient was placed in supine position.  Monitored anesthesia was induced. The patient was repositioned in dorsal lithotomy position. All pressure points were padded per protocol.    A digital rectal exam revealed: Firm right side of the prostate concerning for cancer, cT2b    The operative area was then prepped and draped in the usual sterile fashion.    The transrectal ultrasound probe was inserted into the rectum.  The perineal skin was infiltrated with 5 cc of 0.25% Marcaine plain on both sides of the perineal raphae.  A bilateral pudendal nerve block was performed using the precision point guide.  Using a 22-gauge, 7 inch spinal needle 10 cc of 0.25% Marcaine were infiltrated on either side of the pelvic musculature towards the lateral aspect of the prostate as well as the needle tracks bilaterally.  In total, 30 cc of Marcaine were used.    There was a hypoechoic lesion that corresponded with the suspicious lesion seen on MRI.  MRI fusion was then performed using the [employer] device.  While prostate mapping was being done, the template biopsies were obtained.    An 18-gauge core biopsy needle was then used to obtain 2 biopsies from each site: Posterior medial, posterior lateral, prostate base, anterior prostate bilaterally.  In total, 16 biopsy cores were taken.    We then targeted the suspicious area using fusion technology and took several samples from this area.    Counts were correct. Sign-out was performed with the surgical team confirming the specimen listed below. The patient tolerated the procedure well, emerged from anesthesia without incident, and was transferred to PACU in stable condition.     DIEGO Mcgill MD)  performed the procedure    Complications:  None; patient tolerated the procedure well.    Disposition: PACU - hemodynamically stable.  Condition: stable         Additional Details: none    Attending Attestation: I was present and scrubbed for the entire procedure.    Enrike Mcgill  Phone Number: [phone]

## 2023-10-11 ASSESSMENT — PAIN SCALES - GENERAL: PAINLEVEL_OUTOF10: 0 - NO PAIN

## 2023-10-19 PROBLEM — N13.8 BPH WITH OBSTRUCTION/LOWER URINARY TRACT SYMPTOMS: Status: ACTIVE | Noted: 2023-10-19

## 2023-10-19 PROBLEM — Z98.84 S/P GASTRIC BYPASS: Status: ACTIVE | Noted: 2023-10-19

## 2023-10-19 PROBLEM — N40.1 BPH WITH OBSTRUCTION/LOWER URINARY TRACT SYMPTOMS: Status: ACTIVE | Noted: 2023-10-19

## 2023-10-19 PROBLEM — F41.9 ANXIETY: Status: ACTIVE | Noted: 2023-10-19

## 2023-10-19 PROBLEM — R73.03 PREDIABETES: Status: ACTIVE | Noted: 2023-10-19

## 2023-10-19 LAB
LAB AP ASR DISCLAIMER: NORMAL
LABORATORY COMMENT REPORT: NORMAL
PATH REPORT.FINAL DX SPEC: NORMAL
PATH REPORT.GROSS SPEC: NORMAL
PATH REPORT.RELEVANT HX SPEC: NORMAL
PATH REPORT.TOTAL CANCER: NORMAL

## 2023-10-19 RX ORDER — SILDENAFIL 50 MG/1
TABLET, FILM COATED ORAL
COMMUNITY
Start: 2019-10-30 | End: 2024-01-19 | Stop reason: ALTCHOICE

## 2023-10-23 ENCOUNTER — APPOINTMENT (OUTPATIENT)
Dept: UROLOGY | Facility: CLINIC | Age: 53
End: 2023-10-23
Payer: COMMERCIAL

## 2023-10-23 ENCOUNTER — OFFICE VISIT (OUTPATIENT)
Dept: UROLOGY | Facility: CLINIC | Age: 53
End: 2023-10-23
Payer: COMMERCIAL

## 2023-10-23 VITALS
SYSTOLIC BLOOD PRESSURE: 150 MMHG | TEMPERATURE: 96.9 F | HEART RATE: 72 BPM | WEIGHT: 290 LBS | HEIGHT: 72 IN | BODY MASS INDEX: 39.28 KG/M2 | DIASTOLIC BLOOD PRESSURE: 84 MMHG

## 2023-10-23 DIAGNOSIS — C61 PROSTATE CANCER (MULTI): ICD-10-CM

## 2023-10-23 PROBLEM — R97.20 ELEVATED PSA: Status: RESOLVED | Noted: 2023-05-16 | Resolved: 2023-10-23

## 2023-10-23 PROCEDURE — 1036F TOBACCO NON-USER: CPT | Performed by: UROLOGY

## 2023-10-23 PROCEDURE — 3079F DIAST BP 80-89 MM HG: CPT | Performed by: UROLOGY

## 2023-10-23 PROCEDURE — 3044F HG A1C LEVEL LT 7.0%: CPT | Performed by: UROLOGY

## 2023-10-23 PROCEDURE — 4010F ACE/ARB THERAPY RXD/TAKEN: CPT | Performed by: UROLOGY

## 2023-10-23 PROCEDURE — 99215 OFFICE O/P EST HI 40 MIN: CPT | Performed by: UROLOGY

## 2023-10-23 PROCEDURE — 3077F SYST BP >= 140 MM HG: CPT | Performed by: UROLOGY

## 2023-10-23 NOTE — PROGRESS NOTES
History Of Present Illness  53-year-old male referred to me for evaluation of an elevated PSA  PMH: Type 2 diabetes, BMI 39, chronic venous stasis, hypertension, hyperlipidemia, erectile dysfunction, BPH with LUTS, COPD ( , GOLDY on CPAP  PSHx: 08/2022 - Gastric sleeve - 110lb loss.    PSA history:  08/16/2023-5.9, 8% free  08/2020-3.84    MRI prostate 09/11/2023: 23.3 g gland, PSA density 0.25, PI-RADS 5 lesion right posterior lateral with irregularity of the prostate capsule concerning for extraprostatic extension  Exam - firm R unruly-gland cT2b    TP fusion Bx 10/10/23 - GG2 RP target and RP lateral up to 95% of tissue; GG1 in RPM and RA    CYNTHIA 21  IPSS 16  PROSTATE CA DX  - initial dx 10/10/23 - iPSA 5.9; GG2, cT2b on exam, cT3a by MRI    Past Medical History  He has a past medical history of Hyperlipidemia, Hypertension, Other conditions influencing health status, Personal history of other endocrine, nutritional and metabolic disease, Personal history of other endocrine, nutritional and metabolic disease, Personal history of other endocrine, nutritional and metabolic disease (11/21/2022), and Personal history of other endocrine, nutritional and metabolic disease (12/07/2021).    Surgical History  He has a past surgical history that includes Gastrectomy.     Social History  He reports that he has quit smoking. His smoking use included cigarettes. He has never used smokeless tobacco. He reports current alcohol use. He reports that he does not use drugs.    Family History  Family History   Problem Relation Name Age of Onset    Other (CVA Tenderness) Mother      Other (Cerebrovascular accidnet) Mother      Lung cancer Father          Allergies  Patient has no known allergies.    ROS: 12 system review was completed and is negative with the exception of those signs and symptoms noted in the history of present illness: A 12 system review was completed and is negative with the exception of those signs and symptoms noted  in the history of present illness.     Exam:  General: in NAD, appears stated age  Head: normocephalic, atraumatic  Respiratory: normal effort, no use of accessory muscles  Cardiovascular: no edema noted  Skin: normal turgor, no rashes  Neurologic: grossly intact, oriented to person/place/time  Psychiatric: mode and affect appropriate     Last Recorded Vitals  Blood pressure 150/84, pulse 72, temperature 36.1 °C (96.9 °F), temperature source Temporal, height 1.829 m (6'), weight 132 kg (290 lb).    Lab Results   Component Value Date    PSASCREEN 6.85 (H) 05/12/2023    CREATININE 0.77 05/12/2023    HGB 15.8 05/12/2023         ASSESSMENT/PLAN:  #Prostate cancer-intermediate risk unfavorable.  Unfavorable secondary to exam (cT2b), grade group 2 cancer, MRI concerning for cT3a disease  -We spent a long time today discussing prostatectomy versus radiation  -He is leaning toward surgery but is undecided  -Given his BMI and surgical history, I told him I would lean slightly towards radiation but I still think he is a surgical candidate. I informed him he is at higher risk for hernia post-op and urinary incontinence  -PSMA PET scan to complete staging, he understands there is a low risk for metastases  -If he were to get radiation, I would recommend 6 months of ADT  -We did discuss potentially getting an Oncotype or decipher to determine need for ADT, however he understands this is not widely excepted as the standard of care as of now  -Conversation with Dr. Alaniz to discuss radiation  -We did discuss [employer]/fiducials if he were to get radiation  - surgery date for prostatectomy reserve in the event he opts for surgery    We had a long discussion together today regarding his new diagnosis of prostate cancer. I informed him that about 250,000 cases of prostate cancer are diagnosed annually, and despite this, only about 3% of those patients go on to die of prostate cancer. The reason for this is mostly twofold: 1) prostate  cancer is not an aggressive malignancy in general; and 2) there are excellent treatment options.      We then went through the variable treatment options which would be available to him. We first discussed surgery. I described to him the anatomy of the genitourinary tract, where the prostate is located, and how we remove it. We discussed that, after the prostate is removed, it then requires us to reanastomose the bladder to the urethra, using a catheter as an internal splint. We also discussed that the nerves that control erectile dysfunction are adjacent to the prostate and will be required to be dissected off of the prostate at the time of surgery. We discussed that the surgery can be done as an open technique or now with laparoscopic robotic assistance. We discussed the intraoperative risks of the surgery, which include, but are not limited to bleeding, infection, and damage to adjacent structures, such as the ureters or rectum, which may require further surgery.      We discussed surgical outcomes, comparing open to robotic. I think these are similar, with the exception of robotic surgery having less recovery time and less blood loss. This is well documented in the medical literature.    We discussed the postoperative course after surgery, which would require likely 1-2 days in the hospital if it is done robotically and a full recovery course in approximately three weeks. We discussed that he would be discharged with the catheter in place, and this catheter typically stays in place for approximately one week. We discussed postoperative complications related to the surgery. We focused on both incontinence and erectile dysfunction individually. I told him that, after catheter removal, the average timed incontinence is approximately 45 days and that, at one year, 95% of patients are dry. We also discussed a return of erectile function. I told him that after surgery, it is likely that his erections will not be as  strong, and it may require up to a year before he regains function. Overall, about 70% of patients who had good erectile function preoperatively regain function, although the some require the use of Cialis, Levitra, Viagra, or the like.     We then discussed brachytherapy and radiation therapy. I discussed how each is performed. We discussed the potential risks of radiation, which include, but are not limited to, urethral stricture, incontinence, and significant lower urinary tract symptoms, such as frequency, urgency, dysuria, irritation of the rectum. We discussed oncologic outcomes of radiation versus surgery. We discussed the role of neoadjuvant hormone therapy with radiation. I think he would likely have excellent oncologic results with any type of intervention.      We then compared salvage therapies for radiation failures versus surgery failures. Certainly, if a cancer recurs locally after surgery, he is then a candidate for adjuvant radiation. Prostate cancer recurrence after initial radiation therapy is much more difficult to treat. It is treatable with cryoablation, although that is experimental. Salvage prostatectomy carries with it a significant risk of incontinence (probably 50%), and almost all patients have impotence after such salvage therapy.     After our long discussion today, he opted to proceed with staging and discussion with radiation oncology    Enrike Mcgill MD

## 2023-10-27 ENCOUNTER — HOSPITAL ENCOUNTER (OUTPATIENT)
Dept: RADIOLOGY | Facility: HOSPITAL | Age: 53
Discharge: HOME | End: 2023-10-27
Payer: COMMERCIAL

## 2023-10-27 ENCOUNTER — APPOINTMENT (OUTPATIENT)
Dept: RADIOLOGY | Facility: HOSPITAL | Age: 53
End: 2023-10-27
Payer: COMMERCIAL

## 2023-10-27 DIAGNOSIS — C61 PROSTATE CANCER (MULTI): ICD-10-CM

## 2023-10-27 PROCEDURE — A9800 HC RX 343 DIAGNOSTIC RADIOPHARMACEUTICALS: HCPCS | Performed by: UROLOGY

## 2023-10-27 PROCEDURE — 3430000001 HC RX 343 DIAGNOSTIC RADIOPHARMACEUTICALS: Performed by: UROLOGY

## 2023-10-27 PROCEDURE — 78815 PET IMAGE W/CT SKULL-THIGH: CPT | Performed by: STUDENT IN AN ORGANIZED HEALTH CARE EDUCATION/TRAINING PROGRAM

## 2023-10-27 PROCEDURE — 78815 PET IMAGE W/CT SKULL-THIGH: CPT

## 2023-10-27 RX ADMIN — KIT FOR THE PREPARATION OF GALLIUM GA 68 GOZETOTIDE 5.6 MILLICURIE: 25 INJECTION, POWDER, LYOPHILIZED, FOR SOLUTION INTRAVENOUS at 09:08

## 2023-11-06 ENCOUNTER — APPOINTMENT (OUTPATIENT)
Dept: RADIATION ONCOLOGY | Facility: CLINIC | Age: 53
End: 2023-11-06
Payer: COMMERCIAL

## 2023-11-08 ENCOUNTER — HOSPITAL ENCOUNTER (OUTPATIENT)
Dept: RADIATION ONCOLOGY | Facility: CLINIC | Age: 53
Setting detail: RADIATION/ONCOLOGY SERIES
Discharge: HOME | End: 2023-11-08
Payer: COMMERCIAL

## 2023-11-08 VITALS
RESPIRATION RATE: 18 BRPM | HEIGHT: 70 IN | HEART RATE: 92 BPM | BODY MASS INDEX: 42 KG/M2 | DIASTOLIC BLOOD PRESSURE: 84 MMHG | SYSTOLIC BLOOD PRESSURE: 169 MMHG | WEIGHT: 293.4 LBS | TEMPERATURE: 97.7 F | OXYGEN SATURATION: 95 %

## 2023-11-08 DIAGNOSIS — C61 PROSTATE CANCER (MULTI): ICD-10-CM

## 2023-11-08 PROCEDURE — 99215 OFFICE O/P EST HI 40 MIN: CPT | Performed by: RADIOLOGY

## 2023-11-08 PROCEDURE — 99205 OFFICE O/P NEW HI 60 MIN: CPT | Performed by: RADIOLOGY

## 2023-11-08 ASSESSMENT — COLUMBIA-SUICIDE SEVERITY RATING SCALE - C-SSRS
6. HAVE YOU EVER DONE ANYTHING, STARTED TO DO ANYTHING, OR PREPARED TO DO ANYTHING TO END YOUR LIFE?: NO
2. HAVE YOU ACTUALLY HAD ANY THOUGHTS OF KILLING YOURSELF?: NO
1. IN THE PAST MONTH, HAVE YOU WISHED YOU WERE DEAD OR WISHED YOU COULD GO TO SLEEP AND NOT WAKE UP?: NO

## 2023-11-08 ASSESSMENT — PATIENT HEALTH QUESTIONNAIRE - PHQ9
2. FEELING DOWN, DEPRESSED OR HOPELESS: MORE THAN HALF THE DAYS
SUM OF ALL RESPONSES TO PHQ9 QUESTIONS 1 AND 2: 2
10. IF YOU CHECKED OFF ANY PROBLEMS, HOW DIFFICULT HAVE THESE PROBLEMS MADE IT FOR YOU TO DO YOUR WORK, TAKE CARE OF THINGS AT HOME, OR GET ALONG WITH OTHER PEOPLE: SOMEWHAT DIFFICULT
1. LITTLE INTEREST OR PLEASURE IN DOING THINGS: NOT AT ALL

## 2023-11-08 ASSESSMENT — ENCOUNTER SYMPTOMS
OCCASIONAL FEELINGS OF UNSTEADINESS: 0
DEPRESSION: 1
LOSS OF SENSATION IN FEET: 1

## 2023-11-08 ASSESSMENT — PAIN SCALES - GENERAL: PAINLEVEL: 0-NO PAIN

## 2023-11-08 NOTE — PROGRESS NOTES
Radiation Oncology Outpatient Consult    Patient Name:  Iglesia Rojo  MRN:  [id number]  :  1970    Referring Provider: Enrike Mcgill MD  Primary Care Provider: Maame Jeffers DO  Care Team: Patient Care Team:  Maame Jeffers DO as PCP - General (Family Medicine)    Date of Service: 2023       SUMMARY: 53 y.o. male with high risk prostate cancer, [id number](r), Layne score 3+4=7, 9/17 positive cores, pPSA 6.85 in May 2023, Grade group 2    SUBJECTIVE  History of Present Illness:  Iglesia Rojo is a 53 y.o. male who was referred by Enrike Mcgill MD, for a consultation to the [facility] Department of Radiation Oncology.  He is presenting for evaluation and management of prostate cancer    He initially presented to Urology with an elevated PSA.  He underwent a biopsy  which demonstrated Layne score 3+4=7, 9/17 positive cores. MRI of the prostate showed likely focal EDVIN, no SV involvement, and no pelvic lymph node adenopathy. PSMA PET/CT did not reveal evidence of distant mets but showed some uptake in the left illiac node, while the prostate disease was more on the right side of the prostate    Today, the patient reports feeling well overall. He denies changes in his symptoms.  His sexual Health Inventory for Men score (CYNTHIA) is 20. His International Prostate Symptom Score (IPSS) score is 10. He denies diarrhea or constipation. He denies bone pain.    Prior Radiotherapy:  No  Radiation Treatments       No radiation treatments to show. (Treatments may have been administered in another system.)          Current Systemic Treatment:  No     Presence of Pacemaker or ICD:  No    Past Medical History:    Past Medical History:   Diagnosis Date    Hyperlipidemia     Hypertension     Other conditions influencing health status     Diabetes mellitus with neurological manifestations, uncontrolled    Personal history of other endocrine, nutritional and metabolic disease      History of hyperlipidemia    Personal history of other endocrine, nutritional and metabolic disease     History of morbid obesity    Personal history of other endocrine, nutritional and metabolic disease 11/21/2022    History of type 2 diabetes mellitus    Personal history of other endocrine, nutritional and metabolic disease 12/07/2021    History of morbid obesity     History of autoimmune disease: No  History of Cancer: No    Past Surgical History:    Past Surgical History:   Procedure Laterality Date    GASTRECTOMY      VERTICAL SLEEVE        Family History:  Cancer-related family history includes Lung cancer in his father.    Social History:    Social History     Tobacco Use    Smoking status: Former     Types: Cigarettes    Smokeless tobacco: Never   Substance Use Topics    Alcohol use: Yes     Comment: SOCIALLY    Drug use: Never     Where does the patient live: [place]  Can they come to [facility] for treatment (Yes/No):  Yes  Can they go to [facility] for treatment if indicated (Yes/No): Yes  If no [facility], then where would want to be treated:-   Smoking history (Current/Former/Never): Former smoker, Quit May 2022  Occupation:     Social work Assessment:  Does the patient require social work referral (Yes/No): yes. Possible emotional support    Allergies:  No Known Allergies     Medications:    Current Outpatient Medications:     atorvastatin (Lipitor) 20 mg tablet, Take 1 tablet (20 mg) by mouth once daily., Disp: 90 tablet, Rfl: 3    hydroCHLOROthiazide (HYDRODiuril) 12.5 mg tablet, Take 1 tablet (12.5 mg) by mouth once daily., Disp: 90 tablet, Rfl: 3    lisinopril 40 mg tablet, Take 1 tablet (40 mg) by mouth once daily., Disp: 90 tablet, Rfl: 3    multivitamin tablet, Take by mouth., Disp: , Rfl:     sildenafil (Viagra) 50 mg tablet, Take by mouth once daily., Disp: , Rfl:       Review of Systems:  Review of Systems   Constitutional: Negative.    HENT:  Negative.     Eyes: Negative.   "  Respiratory: Negative.     Cardiovascular:  Positive for leg swelling. Negative for chest pain and palpitations.        Baseline leg swelling   Gastrointestinal: Negative.    Endocrine: Negative.    Genitourinary:  Positive for nocturia. Negative for bladder incontinence, difficulty urinating, dyspareunia, dysuria, frequency, hematuria, pelvic pain and penile discharge.         Nocturia x1   Musculoskeletal:  Positive for back pain. Negative for arthralgias, flank pain, gait problem, myalgias, neck pain and neck stiffness.   Skin: Negative.    Neurological:  Positive for numbness. Negative for dizziness, extremity weakness, gait problem, headaches, light-headedness, seizures and speech difficulty.        Numbness and tingling to BL feet   Hematological: Negative.    Psychiatric/Behavioral: Negative.       The patient's current pain level was assessed.  They report currently having a pain of 0 out of 10.  They feel their pain is under control without the use of pain medications.      Performance Status:  The Karnofsky performance scale today is 100, Fully active, able to carry on all pre-disease performed without restriction (ECOG equivalent 0).       OBJECTIVE  Physical Exam:  /84 (BP Location: Left arm, Patient Position: Sitting, BP Cuff Size: Large adult)   Pulse 92   Temp 36.5 °C (97.7 °F) (Temporal)   Resp 18   Ht 1.778 m (5' 10\")   Wt 133 kg (293 lb 6.4 oz)   SpO2 95%   BMI 42.10 kg/m²    Constitutional: Awake, alert and oriented. No acute distress. Appears stated age.  Eyes: Conjunctivae are clear without exudates or hemorrhage. Eyelids are normal in appearance without swelling or lesions.  ENMT: mucous membranes moist, no apparent injury, no lesions seen  Neck: Neck supple, no apparent injury, trachea midline  Resp/Thorax: Patent airways,  good chest expansion. Thorax symmetric  Cardiovascular: The external chest is normal without lifts, heaves, or thrills. PMI is not visible.  GI: " Nondistended, soft, non-tender.  /Gyn: Deferred  MSK: No tenderness noted on palpation of the spine. No ROM limitations.  Extremities: normal extremities, no cyanosis, erythema or edema.  Neurological: alert and oriented x3. Sensory and motor function appear intact. No gait abnormality observed.  Psych: Appropriate mood and behavior.  Skin: Warm and dry, no new lesions or rashes.      Laboratory Review:    PSA   Date Value Ref Range Status   08/16/2023 5.9 (H) 0.0 - 4.0 ng/mL Final     Comment:     INTERPRETIVE INFORMATION: Prostate Specific Antigen  The Roche PSA electrochemiluminescent immunoassay is used. Results   obtained with different test methods or kits cannot be used   interchangeably. The Roche PSA method is approved for use as an   aid in the detection of prostate cancer when used in conjunction   with a digital rectal exam in individuals with a prostate age 50   years and older. The Roche PSA is also indicated for the serial   measurement of PSA to aid in the prognosis and management of   prostate cancer patients. Elevated PSA concentrations can only   suggest the presence of prostate cancer until biopsy is performed.   PSA concentrations can also be elevated in benign prostatic   hyperplasia or inflammatory conditions of the prostate. PSA is   generally not elevated in healthy individuals or individuals with   nonprostatic carcinoma.     05/23/2020 3.84 0.00 - 4.00 ng/mL Final     Comment:     The FDA requires that the method used for PSA assay be   reported to the physician. Values obtained with different   assay methods must not be used interchangeably. This test   was performed at [facility] (formerly [facility]) using the Siemens  Atellica PSA method, which is a sandwich immunoassay using   chemiluminescence for quantitation. The assay is approved  for measurement of prostate-specific antigen (PSA) in   serum and may be used in conjunction with a digital rectal  examination in men 50 years and older  as an aid in   detection of prostate cancer.   5-Alpha-reductase inhibitors (e.g. Proscar, Finasteride,   Avodart, Dutasteride and Lucy) for the treatment of BPH   have been shown to lower PSA levels by an average of 50%   after 6 months of treatment.       PSA, Free   Date Value Ref Range Status   08/16/2023 0.5 ng/mL Final     PSA, Free Pct   Date Value Ref Range Status   08/16/2023 8 % Final     Comment:     INTERPRETIVE INFORMATION: Prostate Specific Antigen, Free                            Percentage  [employer] uses the Roche Free PSA electrochemiluminescent immunoassay   method in conjunction with the Roche PSA electrochemiluminescent   immunoassay method to determine the free PSA percentage. Values   obtained with different assay methods should not be used   interchangeably. The free PSA percentage is an aid in   distinguishing prostate cancer from benign prostatic conditions in   individuals with a prostate age 50 years and older with a total   PSA between 3 and 10 ng/mL and negative digital rectal examination   findings. Prostatic biopsy is required for the diagnosis of   cancer.   In patients with total PSA concentrations of 4-10 ng/mL, the   probability of finding prostate cancer on needle biopsy by age in   years is:  %fPSA               50-59    60-69    70 or older  0  - 10%            49%      58%      65%  11 - 18%            27%      34%      41%  19 - 25%            18%      24%      30%  Greater than 25%     9%      12%      16%  Other factors may help determine the actual risk of prostate   cancer in individual patients.  Performed By: [facility]  [address] [place] [id number]  : Cristian Cooper MD, PhD  CLIA Number: [id number]         The pertinent lab results were reviewed and discussed with the patient.      Pathology Review:  The pertinent pathology results were reviewed and discussed with the patient.  Notably,     A. PROSTATE NEEDLE BIOPSY RIGHT - RIGHT  POSTERIOR MEDIAL:  --ADENOCARCINOMA, STEVE PATTERN 3 + 3 = 6, GRADE GROUP 1, INVOLVING  2 OF  2 CORES, AND APPROXIMATELY 50% OF THE SPECIMEN. SEE NOTE.      Note: immunostain for PIN-4 confirms the absence of basal cells in the malignant acini.                        B. PROSTATE NEEDLE BIOPSY RIGHT - RIGHT POSTERIOR LATERAL:  --ADENOCARCINOMA, STEVE PATTERN 3 + 4 = 7, GRADE GROUP 2, INVOLVING  2 OF  2 CORES, AND APPROXIMATELY 80% OF THE SPECIMEN. SEE NOTE.     Note: immunostain for PIN-4 confirms the absence of basal cells in the malignant acini.  See note.  [place] pattern 4 comprises less than 5% of the neoplasm, and is represented by fused glands.  No cribriform glands are identified.                 C. PROSTATE NEEDLE BIOPSY RIGHT - RIGHT BASE:  --ADENOCARCINOMA, STEVE PATTERN 3 + 3 = 6, GRADE GROUP 1, INVOLVING  2 OF  2 CORES, AND APPROXIMATELY 15% OF THE SPECIMEN. SEE NOTE.      Note: immunostain for PIN-4 confirms the absence of basal cells in the malignant acini.          D. PROSTATE NEEDLE BIOPSY RIGHT - RIGHT ANTERIOR:  --ADENOCARCINOMA, STEVE PATTERN 3 + 3 = 6, GRADE GROUP 1, INVOLVING  2 OF 2 CORES, AND APPROXIMATELY 20% OF THE SPECIMEN. SEE NOTE.      Note: immunostain for PIN-4 confirms the absence of basal cells in the malignant acini.          E. PROSTATE NEEDLE BIOPSY LEFT - LEFT POSTERIOR MEDIAL:  --PROSTATE TISSUE WITH NO EVIDENCE OF MALIGNANCY.     F. PROSTATE NEEDLE BIOPSY LEFT - LEFT POSTERIOR LATERAL:  --PROSTATE TISSUE WITH NO EVIDENCE OF MALIGNANCY.     G. PROSTATE NEEDLE BIOPSY LEFT - LEFT BASE:  --PROSTATE TISSUE WITH NO EVIDENCE OF MALIGNANCY.               H. PROSTATE NEEDLE BIOPSY LEFT - LEFT ANTERIOR:  --PROSTATE TISSUE WITH NO EVIDENCE OF MALIGNANCY.               I. PROSTATE NEEDLE BIOPSY RIGHT - RIGHT POSTERIOR TARGET:  --ADENOCARCINOMA, STEVE PATTERN 3 + 4 = 7, GRADE GROUP 2, INVOLVING  3 OF  3 CORES, AND APPROXIMATELY 95% OF THE SPECIMEN. SEE NOTE.       Imaging:  The  pertinent imaging results were reviewed and discussed with the patient.  Notably,     IMPRESSION:  1. Ga68 PSMA avid focus within the right peripheral zone of prostate  gland, consistent with biopsy-proven prostate cancer  2. 5 mm mild Ga68 PSMA avid left external iliac node, early gabriel  metastasis can not be excluded, attention on follow-up study  3. No PET evidence of bone metastasis    IMPRESSION:  A PI-RADS 5 lesion in the right posterolateral peripheral zone  extending from the base to the apex. There is broad-based abutment  along with mild bulging and irregularity of the prostatic capsule  concerning for foci of extracapsular invasion. No evidence of  enlarged pelvic lymph nodes.       ASSESSMENT:  Iglesia Rojo is a 53 y.o. male with Prostate cancer (CMS/[facility]), Clinical: cT3a, cN0, PSA: 6.9, Grade Group: 2.     COUNSELING AND COORDINATION OF CARE:  The natural history of prostate cancer was reviewed with the patient. Prognostic factors including the following were discussed: clinical staging, radiologic findings, pretreatment PSA, GS/grade group on biopsy, and the number of biopsy cores involved with cancer, cancer volume, and the patient's performance status/comorbidities.    The patient's the clinical presentation, PSA lab findings and imaging findings were presented. Based on his risk factors, he would be classified as high risk.    We discussed with the patient treatment options including radical prostatectomy, radiation therapy including hypofractionated/ultrahypofractionated radiation therapy with long term androgen deprivation therapy  Potential benefit, side effects and complications of each treatment was discussed.    The indications, benefits, side effects and complications of radiotherapy, which include both acute and late side effects were highlighted. Acute: Fatigue, dysuria, frequency, urine retention, rectal urgency, diarrhea. Late: Stricture, cystitis, proctitis, sexual dysfunction, second  malignancy have all been discussed in detail with the patient. All questions were answered to the patient´s satisfaction.    [We also discussed with the patient enrollment on [facility] 1821, single arm Phase II/III study for systemic therapy escalation with the addition of abiraterone and a PARPi with SBRT    We discussed the role of fiducials and SpaceOAR for radiotherapy to the prostate.  We also discussed the benefit of SpaceOAR for all patients undergoing radiation therapy to help spare rectal toxicity, , especially for treatment with SBRT.     The patient had all questions answered and concerns addressed during the visit. The patient appears to be leaning towards radiotherapy at this time and would like to initiate preparation for treatment.    PLAN:    - Referral to [facility] for systemic therapy   - Screen for [id number] enrollment   - SpaceOAR and fiducials to be placed by Enrike Mcgill    NCCN Guidelines were applicable to guide this patients treatment plan.     Satish Alaniz MD       Future Appointments   Date Time Provider Department Center   11/20/2023  8:50 AM Enrike Mcgill MD [id number]

## 2023-11-13 ENCOUNTER — APPOINTMENT (OUTPATIENT)
Dept: PRIMARY CARE | Facility: CLINIC | Age: 53
End: 2023-11-13
Payer: COMMERCIAL

## 2023-11-16 NOTE — PROGRESS NOTES
INITIAL CONSULTATION: GENITOURINARY CLINIC    PCP: Maame Jeffers DO  Urologist: Enrike Mcgill MD  Radiation Oncologist: Satish Alaniz MD    Diagnosis: High risk prostate cancer (cT3a/ [place] 3+4/ iPSA 6.85)  Current treatment: n/a     Oncologic history:  10/2019: PSA 4.37  5/2023: PSA 6.85; He saw Emma Sutton APRN-CNP. Prostatic exam was unremarkable. Surveillance recommended  8/28/2023: PSA 5.9, free PSA 8%. Recommend prostate biopsy and MRI.   9/11/2023: MRI prostate shows 23.3 g gland, PSA density 0.25, PI-RADS 5 lesion right posterior lateral with irregularity of the prostate capsule concerning for extraprostatic extension. (cT3a)   10/10/2023: Prostate biopsy showed 3+4=7, 9/17 positive cores.   10/23/2023: He saw Enrike Mcgill MD. Prostatic exam: firm R unruly gland. (cT2b). Recommendation of RT over surgery given PSH and BMI.    10/27/2023: PSMA PET showed: 1. Ga68 PSMA avid focus within the right peripheral zone of prostate gland, consistent with biopsy-proven prostate cancer 2. 5 mm mild Ga68 PSMA avid left external iliac node, early gabriel metastasis can not be excluded, attention on follow-up study 3. No PET evidence of bone metastasis  11/8/2023: He saw Satish Alaniz MD who recommended RT followed by ADT vs. Enrollment on MICH 1821.     HPI: Iglesia is overall feeling well. He just saw Dr. Monzon who is on board for radiation + ADT vs. Trial over surgery. He has no symptoms to report. No fevers, chills, headaches, chest discomfort, shortness of breath, abdominal pain, urinary symptoms, bone pain. Intentional weight loss of > 100 lbs since his gastric sleeve. He is no longer diabetic (prediabetic) but still has some LE neuropathy.     PMH: HLD, HTN, prediabetes, chronic venous stasis dermatitis, COPD, anxiety     PSH: vertical sleeve gastrectomy (2022)    Oncologic Family history: father (lung cancer)    Social history: former smoker (quit 5/22; smoked 1.5 packs for 30 years), social ETOH  (6  beers/week)     ROS: Review of Systems   Constitutional:  Negative for appetite change, chills, diaphoresis, fatigue, fever and unexpected weight change.   HENT:   Negative for lump/mass.    Respiratory:  Negative for chest tightness, cough and shortness of breath.    Cardiovascular:  Negative for chest pain, leg swelling and palpitations.   Gastrointestinal:  Negative for abdominal distention, abdominal pain, constipation, diarrhea, nausea and vomiting.   Endocrine: Negative for hot flashes.   Genitourinary:  Negative for bladder incontinence, difficulty urinating, dyspareunia, dysuria, frequency, hematuria and nocturia.    Musculoskeletal:  Negative for back pain, flank pain and gait problem.   Skin:  Negative for rash.   Neurological:  Negative for dizziness, gait problem, headaches, light-headedness, numbness and seizures.   Hematological:  Negative for adenopathy. Does not bruise/bleed easily.     PE:  Physical Exam  Constitutional:       Appearance: Normal appearance. He is well-developed.   HENT:      Head: Normocephalic and atraumatic.      Right Ear: External ear normal. No tenderness.      Left Ear: External ear normal. No tenderness.      Nose: Nose normal.      Mouth/Throat:      Mouth: No injury or oral lesions.      Tongue: No lesions.   Eyes:      Extraocular Movements: Extraocular movements intact.      Conjunctiva/sclera: Conjunctivae normal.      Pupils: Pupils are equal, round, and reactive to light.   Neck:      Thyroid: No thyroid mass.   Abdominal:      General: There is no abdominal bruit.   Musculoskeletal:         General: Normal range of motion.      Cervical back: Normal range of motion and neck supple. No signs of trauma. Normal range of motion.   Lymphadenopathy:      Upper Body:      Right upper body: No axillary adenopathy.      Left upper body: No axillary adenopathy.   Skin:     General: Skin is warm and dry.   Neurological:      General: No focal deficit present.      Mental Status:  He is alert and oriented to person, place, and time.      Gait: Gait is intact.   Psychiatric:         Mood and Affect: Mood and affect normal.         Behavior: Behavior is cooperative.       ECOG 0     Full Pathology Report: 10/10/2023  A. PROSTATE NEEDLE BIOPSY RIGHT - RIGHT POSTERIOR MEDIAL: ADENOCARCINOMA, STEVE PATTERN 3 + 3 = 6, GRADE GROUP 1, INVOLVING  2 OF  2 CORES, AND APPROXIMATELY 50% OF THE SPECIMEN.     B. PROSTATE NEEDLE BIOPSY RIGHT - RIGHT POSTERIOR LATERAL: ADENOCARCINOMA, STEVE PATTERN 3 + 4 = 7, GRADE GROUP 2, INVOLVING  2 OF  2 CORES, AND APPROXIMATELY 80% OF THE SPECIMEN.                 C. PROSTATE NEEDLE BIOPSY RIGHT - RIGHT BASE: ADENOCARCINOMA, STEVE PATTERN 3 + 3 = 6, GRADE GROUP 1, INVOLVING  2 OF  2 CORES, AND APPROXIMATELY 15% OF THE SPECIMEN.      D. PROSTATE NEEDLE BIOPSY RIGHT - RIGHT ANTERIOR: ADENOCARCINOMA, STEVE PATTERN 3 + 3 = 6, GRADE GROUP 1, INVOLVING  2 OF 2 CORES, AND APPROXIMATELY 20% OF THE SPECIMEN.     E. PROSTATE NEEDLE BIOPSY LEFT - LEFT POSTERIOR MEDIAL: PROSTATE TISSUE WITH NO EVIDENCE OF MALIGNANCY.     F. PROSTATE NEEDLE BIOPSY LEFT - LEFT POSTERIOR LATERAL: PROSTATE TISSUE WITH NO EVIDENCE OF MALIGNANCY.     G. PROSTATE NEEDLE BIOPSY LEFT - LEFT BASE: PROSTATE TISSUE WITH NO EVIDENCE OF MALIGNANCY.               H. PROSTATE NEEDLE BIOPSY LEFT - LEFT ANTERIOR: PROSTATE TISSUE WITH NO EVIDENCE OF MALIGNANCY.               I. PROSTATE NEEDLE BIOPSY RIGHT - RIGHT POSTERIOR TARGET: ADENOCARCINOMA, STEVE PATTERN 3 + 4 = 7, GRADE GROUP 2, INVOLVING  3 OF  3 CORES, AND APPROXIMATELY 95% OF THE SPECIMEN. SEE NOTE.    Assessment: High risk prostate cancer (cT3a/ [place] 3+4/ iPSA 6.85)    Plan: Iglesia Rojo is a 53 y.o. male presenting for initial consultation of his new diagnosis of high risk prostate cancer. We discussed his history, pathology, imaging, natural history of the disease; as well as the recommendations from urology and radiation oncology.      We reviewed again the standard of care options EBRT + ADT and RP + PLND. We typically do not recommend ADT alone or observation given the high risk of recurrence and his long life expectancy. I also re-presented BRUCE 1821- a clinical trial currently open at our center. We discussed the rationale, treatment schedule, escalation criteria, and potential MARTINA. Mr. Rojo would like to proceed with BRUCE Correa1. Consent signed today. He will also meet with Lynnette Herndon RN to sign consent. Tentative plan would be to start treatment on 11/30.     As far as the 5 mm L external iliac LN is not pathologicly enlarged and I would not recommend biopsy at this time given its small size. I will get a CT in 3 months. I also will get a DEXA scan given testosterone can accelerate bone loss.     Iglesia Rojo understands the plan and has no further questions.     Juanita Nye MD  Attending Physician  [facility]      [facility] [employer]

## 2023-11-20 ENCOUNTER — LAB (OUTPATIENT)
Dept: LAB | Facility: LAB | Age: 53
End: 2023-11-20
Payer: COMMERCIAL

## 2023-11-20 ENCOUNTER — OFFICE VISIT (OUTPATIENT)
Dept: HEMATOLOGY/ONCOLOGY | Facility: CLINIC | Age: 53
End: 2023-11-20
Payer: COMMERCIAL

## 2023-11-20 ENCOUNTER — HOSPITAL ENCOUNTER (OUTPATIENT)
Dept: CARDIOLOGY | Facility: CLINIC | Age: 53
Discharge: HOME | End: 2023-11-20
Payer: COMMERCIAL

## 2023-11-20 ENCOUNTER — OFFICE VISIT (OUTPATIENT)
Dept: UROLOGY | Facility: CLINIC | Age: 53
End: 2023-11-20
Payer: COMMERCIAL

## 2023-11-20 VITALS
SYSTOLIC BLOOD PRESSURE: 170 MMHG | DIASTOLIC BLOOD PRESSURE: 86 MMHG | BODY MASS INDEX: 39.68 KG/M2 | HEIGHT: 72 IN | WEIGHT: 293 LBS | HEART RATE: 102 BPM

## 2023-11-20 DIAGNOSIS — E11.69 TYPE 2 DIABETES MELLITUS WITH OTHER SPECIFIED COMPLICATION, UNSPECIFIED WHETHER LONG TERM INSULIN USE (MULTI): ICD-10-CM

## 2023-11-20 DIAGNOSIS — C61 PROSTATE CANCER (MULTI): ICD-10-CM

## 2023-11-20 DIAGNOSIS — C61 PROSTATE CANCER (MULTI): Primary | ICD-10-CM

## 2023-11-20 LAB
ALBUMIN SERPL BCP-MCNC: 4.4 G/DL (ref 3.4–5)
ALP SERPL-CCNC: 98 U/L (ref 33–120)
ALT SERPL W P-5'-P-CCNC: 19 U/L (ref 10–52)
ANION GAP SERPL CALC-SCNC: 15 MMOL/L (ref 10–20)
AST SERPL W P-5'-P-CCNC: 20 U/L (ref 9–39)
ATRIAL RATE: 105 BPM
BASOPHILS # BLD AUTO: 0.06 X10*3/UL (ref 0–0.1)
BASOPHILS NFR BLD AUTO: 0.5 %
BILIRUB SERPL-MCNC: 0.9 MG/DL (ref 0–1.2)
BUN SERPL-MCNC: 12 MG/DL (ref 6–23)
CALCIUM SERPL-MCNC: 9.5 MG/DL (ref 8.6–10.3)
CHLORIDE SERPL-SCNC: 97 MMOL/L (ref 98–107)
CO2 SERPL-SCNC: 28 MMOL/L (ref 21–32)
CREAT SERPL-MCNC: 0.78 MG/DL (ref 0.5–1.3)
EOSINOPHIL # BLD AUTO: 0.06 X10*3/UL (ref 0–0.7)
EOSINOPHIL NFR BLD AUTO: 0.5 %
ERYTHROCYTE [DISTWIDTH] IN BLOOD BY AUTOMATED COUNT: 12.6 % (ref 11.5–14.5)
GFR SERPL CREATININE-BSD FRML MDRD: >90 ML/MIN/1.73M*2
GLUCOSE SERPL-MCNC: 88 MG/DL (ref 74–99)
HCT VFR BLD AUTO: 44.3 % (ref 41–52)
HGB BLD-MCNC: 14.9 G/DL (ref 13.5–17.5)
IMM GRANULOCYTES # BLD AUTO: 0.04 X10*3/UL (ref 0–0.7)
IMM GRANULOCYTES NFR BLD AUTO: 0.3 % (ref 0–0.9)
LYMPHOCYTES # BLD AUTO: 3 X10*3/UL (ref 1.2–4.8)
LYMPHOCYTES NFR BLD AUTO: 23.1 %
MCH RBC QN AUTO: 30.3 PG (ref 26–34)
MCHC RBC AUTO-ENTMCNC: 33.6 G/DL (ref 32–36)
MCV RBC AUTO: 90 FL (ref 80–100)
MONOCYTES # BLD AUTO: 0.78 X10*3/UL (ref 0.1–1)
MONOCYTES NFR BLD AUTO: 6 %
NEUTROPHILS # BLD AUTO: 9.03 X10*3/UL (ref 1.2–7.7)
NEUTROPHILS NFR BLD AUTO: 69.6 %
NRBC BLD-RTO: 0 /100 WBCS (ref 0–0)
P AXIS: 36 DEGREES
P OFFSET: 211 MS
P ONSET: 164 MS
PLATELET # BLD AUTO: 254 X10*3/UL (ref 150–450)
POTASSIUM SERPL-SCNC: 3.9 MMOL/L (ref 3.5–5.3)
PR INTERVAL: 124 MS
PROT SERPL-MCNC: 7.2 G/DL (ref 6.4–8.2)
PSA SERPL-MCNC: 6.99 NG/ML
Q ONSET: 226 MS
QRS COUNT: 17 BEATS
QRS DURATION: 94 MS
QT INTERVAL: 348 MS
QTC CALCULATION(BAZETT): 459 MS
QTC FREDERICIA: 419 MS
R AXIS: 59 DEGREES
RBC # BLD AUTO: 4.92 X10*6/UL (ref 4.5–5.9)
SODIUM SERPL-SCNC: 136 MMOL/L (ref 136–145)
T AXIS: 41 DEGREES
T OFFSET: 400 MS
VENTRICULAR RATE: 105 BPM
WBC # BLD AUTO: 13 X10*3/UL (ref 4.4–11.3)

## 2023-11-20 PROCEDURE — 86704 HEP B CORE ANTIBODY TOTAL: CPT

## 2023-11-20 PROCEDURE — 3079F DIAST BP 80-89 MM HG: CPT | Performed by: UROLOGY

## 2023-11-20 PROCEDURE — 99214 OFFICE O/P EST MOD 30 MIN: CPT | Performed by: UROLOGY

## 2023-11-20 PROCEDURE — 3075F SYST BP GE 130 - 139MM HG: CPT | Performed by: INTERNAL MEDICINE

## 2023-11-20 PROCEDURE — 3078F DIAST BP <80 MM HG: CPT | Performed by: INTERNAL MEDICINE

## 2023-11-20 PROCEDURE — 3077F SYST BP >= 140 MM HG: CPT | Performed by: UROLOGY

## 2023-11-20 PROCEDURE — 85025 COMPLETE CBC W/AUTO DIFF WBC: CPT

## 2023-11-20 PROCEDURE — 84153 ASSAY OF PSA TOTAL: CPT

## 2023-11-20 PROCEDURE — 3044F HG A1C LEVEL LT 7.0%: CPT | Performed by: UROLOGY

## 2023-11-20 PROCEDURE — 83036 HEMOGLOBIN GLYCOSYLATED A1C: CPT

## 2023-11-20 PROCEDURE — 1036F TOBACCO NON-USER: CPT | Performed by: INTERNAL MEDICINE

## 2023-11-20 PROCEDURE — 84403 ASSAY OF TOTAL TESTOSTERONE: CPT

## 2023-11-20 PROCEDURE — 93010 ELECTROCARDIOGRAM REPORT: CPT | Performed by: INTERNAL MEDICINE

## 2023-11-20 PROCEDURE — 3080F DIAST BP >= 90 MM HG: CPT | Performed by: INTERNAL MEDICINE

## 2023-11-20 PROCEDURE — 4010F ACE/ARB THERAPY RXD/TAKEN: CPT | Performed by: UROLOGY

## 2023-11-20 PROCEDURE — 36415 COLL VENOUS BLD VENIPUNCTURE: CPT

## 2023-11-20 PROCEDURE — 4010F ACE/ARB THERAPY RXD/TAKEN: CPT | Performed by: INTERNAL MEDICINE

## 2023-11-20 PROCEDURE — 87340 HEPATITIS B SURFACE AG IA: CPT

## 2023-11-20 PROCEDURE — 80053 COMPREHEN METABOLIC PANEL: CPT

## 2023-11-20 PROCEDURE — 99215 OFFICE O/P EST HI 40 MIN: CPT | Mod: 25 | Performed by: INTERNAL MEDICINE

## 2023-11-20 PROCEDURE — 93005 ELECTROCARDIOGRAM TRACING: CPT

## 2023-11-20 PROCEDURE — 99205 OFFICE O/P NEW HI 60 MIN: CPT | Performed by: INTERNAL MEDICINE

## 2023-11-20 PROCEDURE — 86803 HEPATITIS C AB TEST: CPT

## 2023-11-20 PROCEDURE — 1036F TOBACCO NON-USER: CPT | Performed by: UROLOGY

## 2023-11-20 PROCEDURE — 3044F HG A1C LEVEL LT 7.0%: CPT | Performed by: INTERNAL MEDICINE

## 2023-11-20 PROCEDURE — 3077F SYST BP >= 140 MM HG: CPT | Performed by: INTERNAL MEDICINE

## 2023-11-20 RX ORDER — SODIUM CHLORIDE 9 MG/ML
100 INJECTION, SOLUTION INTRAVENOUS CONTINUOUS
Status: CANCELLED | OUTPATIENT
Start: 2023-11-20

## 2023-11-20 RX ORDER — FAMOTIDINE 10 MG/ML
20 INJECTION INTRAVENOUS ONCE AS NEEDED
Status: CANCELLED | OUTPATIENT
Start: 2023-11-27

## 2023-11-20 RX ORDER — ALBUTEROL SULFATE 0.83 MG/ML
3 SOLUTION RESPIRATORY (INHALATION) AS NEEDED
Status: CANCELLED | OUTPATIENT
Start: 2023-11-27

## 2023-11-20 RX ORDER — DIPHENHYDRAMINE HYDROCHLORIDE 50 MG/ML
50 INJECTION INTRAMUSCULAR; INTRAVENOUS AS NEEDED
Status: CANCELLED | OUTPATIENT
Start: 2023-11-27

## 2023-11-20 RX ORDER — EPINEPHRINE 0.3 MG/.3ML
0.3 INJECTION SUBCUTANEOUS EVERY 5 MIN PRN
Status: CANCELLED | OUTPATIENT
Start: 2023-11-27

## 2023-11-20 RX ORDER — PROPRANOLOL HYDROCHLORIDE 10 MG/1
10 TABLET ORAL EVERY 8 HOURS PRN
COMMUNITY
Start: 2023-10-11 | End: 2024-01-19 | Stop reason: SDUPTHER

## 2023-11-20 RX ORDER — ACETAMINOPHEN 325 MG/1
975 TABLET ORAL ONCE
Status: CANCELLED | OUTPATIENT
Start: 2023-11-20 | End: 2023-11-20

## 2023-11-20 ASSESSMENT — ENCOUNTER SYMPTOMS
VOMITING: 0
DEPRESSION: 0
ABDOMINAL DISTENTION: 0
FATIGUE: 0
DIARRHEA: 0
CHEST TIGHTNESS: 0
SHORTNESS OF BREATH: 0
FREQUENCY: 0
SEIZURES: 0
DIZZINESS: 0
PALPITATIONS: 0
BRUISES/BLEEDS EASILY: 0
ADENOPATHY: 0
HEADACHES: 0
DIAPHORESIS: 0
NUMBNESS: 0
LEG SWELLING: 0
NAUSEA: 0
CONSTIPATION: 0
OCCASIONAL FEELINGS OF UNSTEADINESS: 0
HOT FLASHES: 0
BACK PAIN: 0
FLANK PAIN: 0
DYSURIA: 0
HEMATURIA: 0
APPETITE CHANGE: 0
ABDOMINAL PAIN: 0
COUGH: 0
LIGHT-HEADEDNESS: 0
CHILLS: 0
FEVER: 0
UNEXPECTED WEIGHT CHANGE: 0
DIFFICULTY URINATING: 0
LOSS OF SENSATION IN FEET: 0

## 2023-11-20 ASSESSMENT — LIFESTYLE VARIABLES
SKIP TO QUESTIONS 9-10: 1
AUDIT-C TOTAL SCORE: 1
HOW MANY STANDARD DRINKS CONTAINING ALCOHOL DO YOU HAVE ON A TYPICAL DAY: 1 OR 2
HOW OFTEN DO YOU HAVE SIX OR MORE DRINKS ON ONE OCCASION: NEVER
HOW OFTEN DO YOU HAVE A DRINK CONTAINING ALCOHOL: MONTHLY OR LESS

## 2023-11-20 ASSESSMENT — PAIN SCALES - GENERAL: PAINLEVEL: 0-NO PAIN

## 2023-11-20 ASSESSMENT — PATIENT HEALTH QUESTIONNAIRE - PHQ9
SUM OF ALL RESPONSES TO PHQ9 QUESTIONS 1 AND 2: 0
2. FEELING DOWN, DEPRESSED OR HOPELESS: NOT AT ALL
1. LITTLE INTEREST OR PLEASURE IN DOING THINGS: NOT AT ALL

## 2023-11-20 ASSESSMENT — COLUMBIA-SUICIDE SEVERITY RATING SCALE - C-SSRS
2. HAVE YOU ACTUALLY HAD ANY THOUGHTS OF KILLING YOURSELF?: NO
1. IN THE PAST MONTH, HAVE YOU WISHED YOU WERE DEAD OR WISHED YOU COULD GO TO SLEEP AND NOT WAKE UP?: NO
6. HAVE YOU EVER DONE ANYTHING, STARTED TO DO ANYTHING, OR PREPARED TO DO ANYTHING TO END YOUR LIFE?: NO

## 2023-11-20 NOTE — PROGRESS NOTES
PRIOR NOTES  53-year-old male referred to me for evaluation of an elevated PSA  PMH: Type 2 diabetes, BMI 39, chronic venous stasis, hypertension, hyperlipidemia, erectile dysfunction, BPH with LUTS, COPD , GOLDY on CPAP  PSHx: 08/2022 - Gastric sleeve - 110lb loss.     PSA history:  08/16/2023-5.9, 8% free  08/2020-3.84     MRI prostate 09/11/2023: 23.3 g gland, PSA density 0.25, PI-RADS 5 lesion right posterior lateral with irregularity of the prostate capsule concerning for extraprostatic extension  Exam - firm R unruly-gland cT2b     TP fusion Bx 10/10/23 - GG2 RP target and RP lateral up to 95% of tissue; GG1 in RPM and RA     CYNTHIA 21  IPSS 16  PROSTATE CA DX  - initial dx 10/10/23 - iPSA 5.9; GG2, cT2b on exam, cT3a by MRI    UPDATED SUBJECTIVE HISTORY  11/20/23 - Opted to move forward wit SBRT + on umich trial.     Past Medical History  He has a past medical history of Hyperlipidemia, Hypertension, Other conditions influencing health status, Personal history of other endocrine, nutritional and metabolic disease, Personal history of other endocrine, nutritional and metabolic disease, Personal history of other endocrine, nutritional and metabolic disease (11/21/2022), and Personal history of other endocrine, nutritional and metabolic disease (12/07/2021).    Surgical History  He has a past surgical history that includes Gastrectomy.     Social History  He reports that he has quit smoking. His smoking use included cigarettes. He has never used smokeless tobacco. He reports current alcohol use. He reports that he does not use drugs.    Family History  Family History   Problem Relation Name Age of Onset    Other (CVA Tenderness) Mother      Other (Cerebrovascular accidnet) Mother      Lung cancer Father          Allergies  Patient has no known allergies.    ROS: 12 system review was completed and is negative with the exception of those signs and symptoms noted in the history of present illness: A 12 system review was  completed and is negative with the exception of those signs and symptoms noted in the history of present illness.     Exam:  General: in NAD, appears stated age  Head: normocephalic, atraumatic  Respiratory: normal effort, no use of accessory muscles  Cardiovascular: no edema noted  Skin: normal turgor, no rashes  Neurologic: grossly intact, oriented to person/place/time  Psychiatric: mode and affect appropriate     Last Recorded Vitals  Blood pressure 170/86, pulse 102, height 1.829 m (6'), weight 133 kg (293 lb).    Lab Results   Component Value Date    PSASCREEN 6.85 (H) 05/12/2023    CREATININE 0.77 05/12/2023    HGB 15.8 05/12/2023         ASSESSMENT/PLAN:  #Prostate cancer cT3a GG2 high-volume  - due SBRT in 03/24  -Plan for fiducials and SpaceO[employer] hydrogel in February in anticipation of radiation  -I discussed the procedure and side effects  -We once again discussed the pros and cons of surgery versus radiation.  I encouraged him that I am happy that he made a choice and that radiation is an excellent option for him    Enrike Mcgill MD

## 2023-11-21 LAB
EST. AVERAGE GLUCOSE BLD GHB EST-MCNC: 117 MG/DL
HBA1C MFR BLD: 5.7 %
HBV CORE AB SER QL: NONREACTIVE
HBV SURFACE AG SERPL QL IA: NONREACTIVE
HCV AB SER QL: NONREACTIVE
TESTOST SERPL-MCNC: 224 NG/DL (ref 240–1000)

## 2023-11-22 ENCOUNTER — HOSPITAL ENCOUNTER (OUTPATIENT)
Dept: CARDIOLOGY | Facility: CLINIC | Age: 53
Discharge: HOME | End: 2023-11-22
Payer: COMMERCIAL

## 2023-11-22 VITALS
OXYGEN SATURATION: 95 % | SYSTOLIC BLOOD PRESSURE: 132 MMHG | RESPIRATION RATE: 16 BRPM | DIASTOLIC BLOOD PRESSURE: 68 MMHG | TEMPERATURE: 98.1 F | BODY MASS INDEX: 39.17 KG/M2 | WEIGHT: 288.8 LBS | HEART RATE: 119 BPM

## 2023-11-22 DIAGNOSIS — C61 PROSTATE CANCER (MULTI): ICD-10-CM

## 2023-11-22 PROCEDURE — 93005 ELECTROCARDIOGRAM TRACING: CPT

## 2023-11-22 PROCEDURE — 93010 ELECTROCARDIOGRAM REPORT: CPT | Performed by: INTERNAL MEDICINE

## 2023-11-24 DIAGNOSIS — C61 PROSTATE CANCER (MULTI): Primary | ICD-10-CM

## 2023-11-24 RX ORDER — PREDNISONE 5 MG/1
5 TABLET ORAL DAILY
Qty: 28 TABLET | Refills: 5 | Status: SHIPPED | OUTPATIENT
Start: 2023-11-24 | End: 2024-05-10

## 2023-11-27 ENCOUNTER — APPOINTMENT (OUTPATIENT)
Dept: HEMATOLOGY/ONCOLOGY | Facility: CLINIC | Age: 53
End: 2023-11-27
Payer: COMMERCIAL

## 2023-11-27 DIAGNOSIS — C61 PROSTATE CANCER (MULTI): ICD-10-CM

## 2023-11-28 DIAGNOSIS — C61 PROSTATE CANCER (MULTI): Primary | ICD-10-CM

## 2023-11-30 ENCOUNTER — APPOINTMENT (OUTPATIENT)
Dept: HEMATOLOGY/ONCOLOGY | Facility: CLINIC | Age: 53
End: 2023-11-30
Payer: COMMERCIAL

## 2023-11-30 LAB
ATRIAL RATE: 58 BPM
P AXIS: 41 DEGREES
P OFFSET: 194 MS
P ONSET: 138 MS
PR INTERVAL: 162 MS
Q ONSET: 219 MS
QRS COUNT: 9 BEATS
QRS DURATION: 100 MS
QT INTERVAL: 410 MS
QTC CALCULATION(BAZETT): 402 MS
QTC FREDERICIA: 405 MS
R AXIS: 67 DEGREES
T AXIS: 37 DEGREES
T OFFSET: 424 MS
VENTRICULAR RATE: 58 BPM

## 2023-12-01 ENCOUNTER — APPOINTMENT (OUTPATIENT)
Dept: HEMATOLOGY/ONCOLOGY | Facility: CLINIC | Age: 53
End: 2023-12-01
Payer: COMMERCIAL

## 2023-12-01 ENCOUNTER — LAB (OUTPATIENT)
Dept: LAB | Facility: LAB | Age: 53
End: 2023-12-01
Payer: COMMERCIAL

## 2023-12-01 DIAGNOSIS — C61 PROSTATE CANCER (MULTI): ICD-10-CM

## 2023-12-01 LAB
ALBUMIN SERPL BCP-MCNC: 3.9 G/DL (ref 3.4–5)
ALP SERPL-CCNC: 84 U/L (ref 33–120)
ALT SERPL W P-5'-P-CCNC: 15 U/L (ref 10–52)
ANION GAP SERPL CALC-SCNC: 10 MMOL/L (ref 10–20)
AST SERPL W P-5'-P-CCNC: 17 U/L (ref 9–39)
BASOPHILS # BLD AUTO: 0.06 X10*3/UL (ref 0–0.1)
BASOPHILS NFR BLD AUTO: 0.7 %
BILIRUB SERPL-MCNC: 0.5 MG/DL (ref 0–1.2)
BUN SERPL-MCNC: 15 MG/DL (ref 6–23)
CALCIUM SERPL-MCNC: 9 MG/DL (ref 8.6–10.3)
CHLORIDE SERPL-SCNC: 101 MMOL/L (ref 98–107)
CO2 SERPL-SCNC: 33 MMOL/L (ref 21–32)
CREAT SERPL-MCNC: 0.75 MG/DL (ref 0.5–1.3)
EOSINOPHIL # BLD AUTO: 0.31 X10*3/UL (ref 0–0.7)
EOSINOPHIL NFR BLD AUTO: 3.7 %
ERYTHROCYTE [DISTWIDTH] IN BLOOD BY AUTOMATED COUNT: 12.8 % (ref 11.5–14.5)
GFR SERPL CREATININE-BSD FRML MDRD: >90 ML/MIN/1.73M*2
GLUCOSE SERPL-MCNC: 102 MG/DL (ref 74–99)
HCT VFR BLD AUTO: 41.5 % (ref 41–52)
HGB BLD-MCNC: 13.7 G/DL (ref 13.5–17.5)
IMM GRANULOCYTES # BLD AUTO: 0.03 X10*3/UL (ref 0–0.7)
IMM GRANULOCYTES NFR BLD AUTO: 0.4 % (ref 0–0.9)
LYMPHOCYTES # BLD AUTO: 2.72 X10*3/UL (ref 1.2–4.8)
LYMPHOCYTES NFR BLD AUTO: 32.5 %
MCH RBC QN AUTO: 30.2 PG (ref 26–34)
MCHC RBC AUTO-ENTMCNC: 33 G/DL (ref 32–36)
MCV RBC AUTO: 91 FL (ref 80–100)
MONOCYTES # BLD AUTO: 0.61 X10*3/UL (ref 0.1–1)
MONOCYTES NFR BLD AUTO: 7.3 %
NEUTROPHILS # BLD AUTO: 4.65 X10*3/UL (ref 1.2–7.7)
NEUTROPHILS NFR BLD AUTO: 55.4 %
NRBC BLD-RTO: 0 /100 WBCS (ref 0–0)
PLATELET # BLD AUTO: 213 X10*3/UL (ref 150–450)
POTASSIUM SERPL-SCNC: 4.9 MMOL/L (ref 3.5–5.3)
PROT SERPL-MCNC: 6.4 G/DL (ref 6.4–8.2)
PSA SERPL-MCNC: 5.18 NG/ML
RBC # BLD AUTO: 4.54 X10*6/UL (ref 4.5–5.9)
SODIUM SERPL-SCNC: 139 MMOL/L (ref 136–145)
TESTOST SERPL-MCNC: 427 NG/DL (ref 240–1000)
WBC # BLD AUTO: 8.4 X10*3/UL (ref 4.4–11.3)

## 2023-12-01 PROCEDURE — 80053 COMPREHEN METABOLIC PANEL: CPT

## 2023-12-01 PROCEDURE — 85025 COMPLETE CBC W/AUTO DIFF WBC: CPT

## 2023-12-01 PROCEDURE — 36415 COLL VENOUS BLD VENIPUNCTURE: CPT

## 2023-12-01 PROCEDURE — 84153 ASSAY OF PSA TOTAL: CPT

## 2023-12-01 PROCEDURE — 84403 ASSAY OF TOTAL TESTOSTERONE: CPT

## 2023-12-01 NOTE — PROGRESS NOTES
FOLLOW UP: GENITOURINARY CANCER CLINIC    Diagnosis: High risk prostate cancer (cT3a/ [place]ne 3+4/ iPSA 6.85)   Oncologic history (see initial consultation note for more detail)    Current therapy: [facility] 1821     Interval history: Ilgesia Rojo is a 53 y.o. year old male who presents today to clinic for trial initiation. He received his prednisone. He remains asymptomatic currently.     ROS: Review of Systems   Constitutional:  Negative for appetite change, chills, diaphoresis, fatigue, fever and unexpected weight change.   HENT:   Negative for lump/mass.    Respiratory:  Negative for chest tightness, cough and shortness of breath.    Cardiovascular:  Negative for chest pain, leg swelling and palpitations.   Gastrointestinal:  Negative for abdominal distention, abdominal pain, constipation, diarrhea, nausea and vomiting.   Endocrine: Negative for hot flashes.   Genitourinary:  Negative for bladder incontinence, difficulty urinating, dyspareunia, dysuria, frequency, hematuria and nocturia.    Musculoskeletal:  Negative for back pain, flank pain and gait problem.   Skin:  Negative for rash.   Neurological:  Negative for dizziness, gait problem, headaches, light-headedness, numbness and seizures.   Hematological:  Negative for adenopathy. Does not bruise/bleed easily.   Psychiatric/Behavioral:  The patient is not nervous/anxious.        PE:  Physical Exam  Vitals reviewed.   Constitutional:       Appearance: Normal appearance.   HENT:      Head: Normocephalic and atraumatic.      Nose: Nose normal.      Mouth/Throat:      Mouth: Mucous membranes are moist.      Pharynx: Oropharynx is clear.   Eyes:      Extraocular Movements: Extraocular movements intact.      Conjunctiva/sclera: Conjunctivae normal.   Cardiovascular:      Rate and Rhythm: Normal rate and regular rhythm.   Pulmonary:      Effort: Pulmonary effort is normal.      Breath sounds: Normal breath sounds.   Abdominal:      General: Abdomen is  flat. Bowel sounds are normal.      Palpations: Abdomen is soft.   Musculoskeletal:         General: Normal range of motion.      Cervical back: Normal range of motion.   Skin:     General: Skin is warm and dry.   Neurological:      General: No focal deficit present.      Mental Status: He is alert and oriented to person, place, and time. Mental status is at baseline.   Psychiatric:         Mood and Affect: Mood normal.         Behavior: Behavior normal.         Thought Content: Thought content normal.         Judgment: Judgment normal.       ECOG 0    Labs:   12/01/23 07:05   GLUCOSE 102 (H)   SODIUM 139   POTASSIUM 4.9   CHLORIDE 101   Bicarbonate 33 (H)   Anion Gap 10   Blood Urea Nitrogen 15   Creatinine 0.75   EGFR >90   Calcium 9.0   Albumin 3.9   Alkaline Phosphatase 84   ALT 15   AST 17   Bilirubin Total 0.5   Total Protein 6.4   Testosterone 427   WBC 8.4   nRBC 0.0   RBC 4.54   HEMOGLOBIN 13.7   HEMATOCRIT 41.5   MCV 91   MCH 30.2   MCHC 33.0   RED CELL DISTRIBUTION WIDTH 12.8   Platelets 213   Neutrophils % 55.4   Immature Granulocytes %, Automated 0.4   Lymphocytes % 32.5   Monocytes % 7.3   Eosinophils % 3.7   Basophils % 0.7   Neutrophils Absolute 4.65   Immature Granulocytes Absolute, Automated 0.03   Lymphocytes Absolute 2.72   Monocytes Absolute 0.61   Eosinophils Absolute 0.31   Basophils Absolute 0.06      05/23/20 08:42 08/16/23 07:17 11/20/23 13:15 12/01/23 07:05   PSA 3.84 5.9 (H) 6.99 (H) 5.18 (H)     Imaging: EKG 11/22/23: reviewed in Epic     Assessment: High risk prostate cancer (cT3a/ Layne 3+4/ iPSA 6.85)     Plan: Iglesia Rojo is a 53 y.o. year old male who presents today to clinic for cycle 1 of BR[facility] 1821. He overall feels well. We went over the trial one more time. He is aware of the treatment schedule and how to take the medications. He will get the study drugs today and return Friday for Lupron.     We went over the role of med onc, urology, rad onc, and PCP. We went over the  rationale of a baseline DEXA. We will not need any further imaging unless his PSA rises. He understands we will be his first call if he feels unwell or needs something. He will get his vaccinations as scheduled.     Iglesia Darrel understands the plan and has no further questions.     Juanita Nye MD  Attending Physician  [facility]      [facility] [employer]

## 2023-12-04 ENCOUNTER — APPOINTMENT (OUTPATIENT)
Dept: LAB | Facility: LAB | Age: 53
End: 2023-12-04
Payer: COMMERCIAL

## 2023-12-04 ENCOUNTER — DOCUMENTATION (OUTPATIENT)
Dept: HEMATOLOGY/ONCOLOGY | Facility: CLINIC | Age: 53
End: 2023-12-04

## 2023-12-04 ENCOUNTER — OFFICE VISIT (OUTPATIENT)
Dept: HEMATOLOGY/ONCOLOGY | Facility: CLINIC | Age: 53
End: 2023-12-04
Payer: COMMERCIAL

## 2023-12-04 VITALS
WEIGHT: 288.8 LBS | HEART RATE: 93 BPM | OXYGEN SATURATION: 96 % | DIASTOLIC BLOOD PRESSURE: 90 MMHG | RESPIRATION RATE: 16 BRPM | BODY MASS INDEX: 39.17 KG/M2 | SYSTOLIC BLOOD PRESSURE: 172 MMHG | TEMPERATURE: 98.4 F

## 2023-12-04 DIAGNOSIS — C61 PROSTATE CANCER (MULTI): Primary | ICD-10-CM

## 2023-12-04 DIAGNOSIS — C61 PROSTATE CANCER (MULTI): ICD-10-CM

## 2023-12-04 PROCEDURE — 3077F SYST BP >= 140 MM HG: CPT | Performed by: INTERNAL MEDICINE

## 2023-12-04 PROCEDURE — 99215 OFFICE O/P EST HI 40 MIN: CPT | Performed by: INTERNAL MEDICINE

## 2023-12-04 PROCEDURE — 4010F ACE/ARB THERAPY RXD/TAKEN: CPT | Performed by: INTERNAL MEDICINE

## 2023-12-04 PROCEDURE — 1036F TOBACCO NON-USER: CPT | Performed by: INTERNAL MEDICINE

## 2023-12-04 PROCEDURE — 3080F DIAST BP >= 90 MM HG: CPT | Performed by: INTERNAL MEDICINE

## 2023-12-04 PROCEDURE — 3044F HG A1C LEVEL LT 7.0%: CPT | Performed by: INTERNAL MEDICINE

## 2023-12-04 ASSESSMENT — ENCOUNTER SYMPTOMS
DIARRHEA: 0
FREQUENCY: 0
NAUSEA: 0
NERVOUS/ANXIOUS: 0
ABDOMINAL PAIN: 0
PALPITATIONS: 0
UNEXPECTED WEIGHT CHANGE: 0
BACK PAIN: 0
ABDOMINAL DISTENTION: 0
LEG SWELLING: 0
DIAPHORESIS: 0
DYSURIA: 0
ADENOPATHY: 0
SEIZURES: 0
HEMATURIA: 0
FEVER: 0
APPETITE CHANGE: 0
DIFFICULTY URINATING: 0
FLANK PAIN: 0
LIGHT-HEADEDNESS: 0
VOMITING: 0
OCCASIONAL FEELINGS OF UNSTEADINESS: 0
SHORTNESS OF BREATH: 0
BRUISES/BLEEDS EASILY: 0
CONSTIPATION: 0
DEPRESSION: 0
FATIGUE: 0
CHEST TIGHTNESS: 0
HEADACHES: 0
LOSS OF SENSATION IN FEET: 0
CHILLS: 0
NUMBNESS: 0
COUGH: 0
HOT FLASHES: 0
DIZZINESS: 0

## 2023-12-04 ASSESSMENT — PAIN SCALES - GENERAL: PAINLEVEL: 0-NO PAIN

## 2023-12-04 NOTE — PROGRESS NOTES
Research Note Treatment Day    Iglesia Rojo is here today for treatment on [facility] 1821. Today is C1D1. Procedures completed per protocol. AE's and con-meds reviewed with patient. Patient is aware of treatment plan.    [x]   Received treatment as planned   OR  []    Treatment delayed; patient calendar updated as required   Treatment delayed because:    []   AE    []   Physician Discretion    []   Clinical Deterioration or Progression     []   Other    Education Documentation  No documentation found.  Education Comments  No comments found.      Instructed patient on dose, frequency, route of study meds Niraparib and Abiraterone.  Patient verbalized understanding and was able to recite dose, frequency, and route of study meds.  Research labs drawn today.  Patient denies complaints today.

## 2023-12-05 ENCOUNTER — APPOINTMENT (OUTPATIENT)
Dept: RADIOLOGY | Facility: CLINIC | Age: 53
End: 2023-12-05
Payer: COMMERCIAL

## 2023-12-05 DIAGNOSIS — C61 PROSTATE CANCER (MULTI): Primary | ICD-10-CM

## 2023-12-08 ENCOUNTER — APPOINTMENT (OUTPATIENT)
Dept: RADIOLOGY | Facility: CLINIC | Age: 53
End: 2023-12-08
Payer: COMMERCIAL

## 2023-12-08 ENCOUNTER — INFUSION (OUTPATIENT)
Dept: HEMATOLOGY/ONCOLOGY | Facility: CLINIC | Age: 53
End: 2023-12-08
Payer: COMMERCIAL

## 2023-12-08 VITALS
DIASTOLIC BLOOD PRESSURE: 93 MMHG | WEIGHT: 287.6 LBS | BODY MASS INDEX: 40.26 KG/M2 | SYSTOLIC BLOOD PRESSURE: 159 MMHG | TEMPERATURE: 97.5 F | HEART RATE: 89 BPM | OXYGEN SATURATION: 98 % | HEIGHT: 71 IN | RESPIRATION RATE: 16 BRPM

## 2023-12-08 DIAGNOSIS — C61 PROSTATE CANCER (MULTI): ICD-10-CM

## 2023-12-08 PROCEDURE — 96402 CHEMO HORMON ANTINEOPL SQ/IM: CPT

## 2023-12-08 PROCEDURE — 2500000004 HC RX 250 GENERAL PHARMACY W/ HCPCS (ALT 636 FOR OP/ED): Mod: JZ | Performed by: INTERNAL MEDICINE

## 2023-12-08 RX ORDER — ALBUTEROL SULFATE 0.83 MG/ML
3 SOLUTION RESPIRATORY (INHALATION) AS NEEDED
Status: CANCELLED | OUTPATIENT
Start: 2024-02-25

## 2023-12-08 RX ORDER — DIPHENHYDRAMINE HYDROCHLORIDE 50 MG/ML
50 INJECTION INTRAMUSCULAR; INTRAVENOUS AS NEEDED
Status: CANCELLED | OUTPATIENT
Start: 2024-02-25

## 2023-12-08 RX ORDER — EPINEPHRINE 0.3 MG/.3ML
0.3 INJECTION SUBCUTANEOUS EVERY 5 MIN PRN
Status: CANCELLED | OUTPATIENT
Start: 2024-02-25

## 2023-12-08 RX ORDER — FAMOTIDINE 10 MG/ML
20 INJECTION INTRAVENOUS ONCE AS NEEDED
Status: CANCELLED | OUTPATIENT
Start: 2024-02-25

## 2023-12-08 RX ADMIN — LEUPROLIDE ACETATE 22.5 MG: KIT at 09:28

## 2023-12-08 ASSESSMENT — PAIN SCALES - GENERAL: PAINLEVEL: 0-NO PAIN

## 2023-12-11 ENCOUNTER — LAB (OUTPATIENT)
Dept: LAB | Facility: LAB | Age: 53
End: 2023-12-11
Payer: COMMERCIAL

## 2023-12-11 ENCOUNTER — CLINICAL SUPPORT (OUTPATIENT)
Dept: HEMATOLOGY/ONCOLOGY | Facility: CLINIC | Age: 53
End: 2023-12-11
Payer: COMMERCIAL

## 2023-12-11 VITALS — SYSTOLIC BLOOD PRESSURE: 154 MMHG | DIASTOLIC BLOOD PRESSURE: 80 MMHG | HEART RATE: 72 BPM

## 2023-12-11 DIAGNOSIS — C61 PROSTATE CANCER (MULTI): ICD-10-CM

## 2023-12-11 LAB
BASOPHILS # BLD AUTO: 0.06 X10*3/UL (ref 0–0.1)
BASOPHILS NFR BLD AUTO: 0.5 %
EOSINOPHIL # BLD AUTO: 0.24 X10*3/UL (ref 0–0.7)
EOSINOPHIL NFR BLD AUTO: 2 %
ERYTHROCYTE [DISTWIDTH] IN BLOOD BY AUTOMATED COUNT: 12.6 % (ref 11.5–14.5)
HCT VFR BLD AUTO: 42.5 % (ref 41–52)
HGB BLD-MCNC: 14.5 G/DL (ref 13.5–17.5)
IMM GRANULOCYTES # BLD AUTO: 0.03 X10*3/UL (ref 0–0.7)
IMM GRANULOCYTES NFR BLD AUTO: 0.2 % (ref 0–0.9)
LYMPHOCYTES # BLD AUTO: 3.11 X10*3/UL (ref 1.2–4.8)
LYMPHOCYTES NFR BLD AUTO: 25.7 %
MCH RBC QN AUTO: 30.7 PG (ref 26–34)
MCHC RBC AUTO-ENTMCNC: 34.1 G/DL (ref 32–36)
MCV RBC AUTO: 90 FL (ref 80–100)
MONOCYTES # BLD AUTO: 0.61 X10*3/UL (ref 0.1–1)
MONOCYTES NFR BLD AUTO: 5 %
NEUTROPHILS # BLD AUTO: 8.03 X10*3/UL (ref 1.2–7.7)
NEUTROPHILS NFR BLD AUTO: 66.6 %
NRBC BLD-RTO: 0 /100 WBCS (ref 0–0)
PLATELET # BLD AUTO: 287 X10*3/UL (ref 150–450)
RBC # BLD AUTO: 4.73 X10*6/UL (ref 4.5–5.9)
WBC # BLD AUTO: 12.1 X10*3/UL (ref 4.4–11.3)

## 2023-12-11 PROCEDURE — 36415 COLL VENOUS BLD VENIPUNCTURE: CPT

## 2023-12-11 PROCEDURE — 85025 COMPLETE CBC W/AUTO DIFF WBC: CPT

## 2023-12-11 NOTE — PROGRESS NOTES
Research Note Non-Treatment Day    Iglesia Rojo is here today. Patient is on [facility] 1821. Today is C1D8. Procedures completed per protocol. AE's and con-meds reviewed with patient. Patient is aware of treatment plan.      Education Documentation  No documentation found.  Education Comments  No comments found.     Patient here for vital sign check.  Patient denies complaints.

## 2023-12-15 ENCOUNTER — ANCILLARY PROCEDURE (OUTPATIENT)
Dept: RADIOLOGY | Facility: CLINIC | Age: 53
End: 2023-12-15
Payer: COMMERCIAL

## 2023-12-15 DIAGNOSIS — C61 PROSTATE CANCER (MULTI): ICD-10-CM

## 2023-12-15 PROCEDURE — 77080 DXA BONE DENSITY AXIAL: CPT | Performed by: RADIOLOGY

## 2023-12-15 PROCEDURE — 77080 DXA BONE DENSITY AXIAL: CPT

## 2023-12-18 ENCOUNTER — LAB (OUTPATIENT)
Dept: LAB | Facility: LAB | Age: 53
End: 2023-12-18
Payer: COMMERCIAL

## 2023-12-18 ENCOUNTER — CLINICAL SUPPORT (OUTPATIENT)
Dept: HEMATOLOGY/ONCOLOGY | Facility: CLINIC | Age: 53
End: 2023-12-18
Payer: COMMERCIAL

## 2023-12-18 VITALS
HEART RATE: 72 BPM | TEMPERATURE: 96.3 F | SYSTOLIC BLOOD PRESSURE: 138 MMHG | DIASTOLIC BLOOD PRESSURE: 70 MMHG | WEIGHT: 281.31 LBS | BODY MASS INDEX: 39.56 KG/M2

## 2023-12-18 DIAGNOSIS — C61 PROSTATE CANCER (MULTI): ICD-10-CM

## 2023-12-18 LAB
ALBUMIN SERPL BCP-MCNC: 4.3 G/DL (ref 3.4–5)
ALP SERPL-CCNC: 113 U/L (ref 33–120)
ALT SERPL W P-5'-P-CCNC: 18 U/L (ref 10–52)
ANION GAP SERPL CALC-SCNC: 14 MMOL/L (ref 10–20)
AST SERPL W P-5'-P-CCNC: 20 U/L (ref 9–39)
BASOPHILS # BLD AUTO: 0.08 X10*3/UL (ref 0–0.1)
BASOPHILS NFR BLD AUTO: 0.8 %
BILIRUB SERPL-MCNC: 0.9 MG/DL (ref 0–1.2)
BUN SERPL-MCNC: 15 MG/DL (ref 6–23)
CALCIUM SERPL-MCNC: 9.4 MG/DL (ref 8.6–10.3)
CHLORIDE SERPL-SCNC: 97 MMOL/L (ref 98–107)
CO2 SERPL-SCNC: 30 MMOL/L (ref 21–32)
CREAT SERPL-MCNC: 1.23 MG/DL (ref 0.5–1.3)
EOSINOPHIL # BLD AUTO: 0.24 X10*3/UL (ref 0–0.7)
EOSINOPHIL NFR BLD AUTO: 2.3 %
ERYTHROCYTE [DISTWIDTH] IN BLOOD BY AUTOMATED COUNT: 12.4 % (ref 11.5–14.5)
GFR SERPL CREATININE-BSD FRML MDRD: 70 ML/MIN/1.73M*2
GLUCOSE SERPL-MCNC: 113 MG/DL (ref 74–99)
HCT VFR BLD AUTO: 41.7 % (ref 41–52)
HGB BLD-MCNC: 14.4 G/DL (ref 13.5–17.5)
IMM GRANULOCYTES # BLD AUTO: 0.04 X10*3/UL (ref 0–0.7)
IMM GRANULOCYTES NFR BLD AUTO: 0.4 % (ref 0–0.9)
LYMPHOCYTES # BLD AUTO: 3 X10*3/UL (ref 1.2–4.8)
LYMPHOCYTES NFR BLD AUTO: 28.8 %
MCH RBC QN AUTO: 30.6 PG (ref 26–34)
MCHC RBC AUTO-ENTMCNC: 34.5 G/DL (ref 32–36)
MCV RBC AUTO: 89 FL (ref 80–100)
MONOCYTES # BLD AUTO: 0.55 X10*3/UL (ref 0.1–1)
MONOCYTES NFR BLD AUTO: 5.3 %
NEUTROPHILS # BLD AUTO: 6.52 X10*3/UL (ref 1.2–7.7)
NEUTROPHILS NFR BLD AUTO: 62.4 %
NRBC BLD-RTO: 0 /100 WBCS (ref 0–0)
PLATELET # BLD AUTO: 289 X10*3/UL (ref 150–450)
POTASSIUM SERPL-SCNC: 4.9 MMOL/L (ref 3.5–5.3)
PROT SERPL-MCNC: 6.9 G/DL (ref 6.4–8.2)
RBC # BLD AUTO: 4.7 X10*6/UL (ref 4.5–5.9)
SODIUM SERPL-SCNC: 136 MMOL/L (ref 136–145)
WBC # BLD AUTO: 10.4 X10*3/UL (ref 4.4–11.3)

## 2023-12-18 PROCEDURE — 36415 COLL VENOUS BLD VENIPUNCTURE: CPT

## 2023-12-18 PROCEDURE — 80053 COMPREHEN METABOLIC PANEL: CPT

## 2023-12-18 PROCEDURE — 85025 COMPLETE CBC W/AUTO DIFF WBC: CPT

## 2023-12-18 NOTE — PROGRESS NOTES
Research Note Non-Treatment Day    Iglesia Rojo is here today. Patient is on [facility] 1821. Today is C1D15. Procedures completed per protocol. AE's and con-meds reviewed with patient. Patient is aware of treatment plan.      Education Documentation  No documentation found.  Education Comments  No comments found.     Patient here for vital sign and weight check.  Patient denies complaints.

## 2023-12-28 ENCOUNTER — LAB (OUTPATIENT)
Dept: LAB | Facility: LAB | Age: 53
End: 2023-12-28
Payer: COMMERCIAL

## 2023-12-28 ENCOUNTER — CLINICAL SUPPORT (OUTPATIENT)
Dept: HEMATOLOGY/ONCOLOGY | Facility: CLINIC | Age: 53
End: 2023-12-28
Payer: COMMERCIAL

## 2023-12-28 VITALS — HEART RATE: 76 BPM | DIASTOLIC BLOOD PRESSURE: 62 MMHG | SYSTOLIC BLOOD PRESSURE: 120 MMHG

## 2023-12-28 DIAGNOSIS — C61 PROSTATE CANCER (MULTI): ICD-10-CM

## 2023-12-28 DIAGNOSIS — C61 PROSTATE CANCER (MULTI): Primary | ICD-10-CM

## 2023-12-28 LAB
BASOPHILS # BLD AUTO: 0.08 X10*3/UL (ref 0–0.1)
BASOPHILS NFR BLD AUTO: 0.7 %
EOSINOPHIL # BLD AUTO: 0.21 X10*3/UL (ref 0–0.7)
EOSINOPHIL NFR BLD AUTO: 2 %
ERYTHROCYTE [DISTWIDTH] IN BLOOD BY AUTOMATED COUNT: 12.4 % (ref 11.5–14.5)
HCT VFR BLD AUTO: 41.9 % (ref 41–52)
HGB BLD-MCNC: 14.3 G/DL (ref 13.5–17.5)
IMM GRANULOCYTES # BLD AUTO: 0.02 X10*3/UL (ref 0–0.7)
IMM GRANULOCYTES NFR BLD AUTO: 0.2 % (ref 0–0.9)
LYMPHOCYTES # BLD AUTO: 3.12 X10*3/UL (ref 1.2–4.8)
LYMPHOCYTES NFR BLD AUTO: 29.1 %
MCH RBC QN AUTO: 30.6 PG (ref 26–34)
MCHC RBC AUTO-ENTMCNC: 34.1 G/DL (ref 32–36)
MCV RBC AUTO: 90 FL (ref 80–100)
MONOCYTES # BLD AUTO: 0.63 X10*3/UL (ref 0.1–1)
MONOCYTES NFR BLD AUTO: 5.9 %
NEUTROPHILS # BLD AUTO: 6.66 X10*3/UL (ref 1.2–7.7)
NEUTROPHILS NFR BLD AUTO: 62.1 %
NRBC BLD-RTO: 0 /100 WBCS (ref 0–0)
PLATELET # BLD AUTO: 245 X10*3/UL (ref 150–450)
RBC # BLD AUTO: 4.68 X10*6/UL (ref 4.5–5.9)
WBC # BLD AUTO: 10.7 X10*3/UL (ref 4.4–11.3)

## 2023-12-28 PROCEDURE — 85025 COMPLETE CBC W/AUTO DIFF WBC: CPT

## 2023-12-28 PROCEDURE — 36415 COLL VENOUS BLD VENIPUNCTURE: CPT

## 2023-12-28 NOTE — PROGRESS NOTES
Research Note Non-Treatment Day    Iglesia Rojo is here today. Patient is on [facility] 1821. Today is C1D22. Procedures completed per protocol. AE's and con-meds reviewed with patient. Patient is aware of treatment plan.      Education Documentation  No documentation found.  Education Comments  No comments found.     Patient here today for pulse and BP check.  Patient denies complaints.  Patient will return on 01/04/24 to start cycle 2.

## 2024-01-03 ENCOUNTER — LAB (OUTPATIENT)
Dept: LAB | Facility: LAB | Age: 54
End: 2024-01-03
Payer: COMMERCIAL

## 2024-01-03 DIAGNOSIS — C61 PROSTATE CANCER (MULTI): ICD-10-CM

## 2024-01-03 LAB
ALBUMIN SERPL BCP-MCNC: 4.1 G/DL (ref 3.4–5)
ALP SERPL-CCNC: 116 U/L (ref 33–120)
ALT SERPL W P-5'-P-CCNC: 15 U/L (ref 10–52)
ANION GAP SERPL CALC-SCNC: 14 MMOL/L (ref 10–20)
AST SERPL W P-5'-P-CCNC: 18 U/L (ref 9–39)
BASOPHILS # BLD AUTO: 0.07 X10*3/UL (ref 0–0.1)
BASOPHILS NFR BLD AUTO: 0.7 %
BILIRUB SERPL-MCNC: 0.9 MG/DL (ref 0–1.2)
BUN SERPL-MCNC: 20 MG/DL (ref 6–23)
CALCIUM SERPL-MCNC: 9.2 MG/DL (ref 8.6–10.3)
CHLORIDE SERPL-SCNC: 98 MMOL/L (ref 98–107)
CO2 SERPL-SCNC: 31 MMOL/L (ref 21–32)
CREAT SERPL-MCNC: 1.17 MG/DL (ref 0.5–1.3)
EOSINOPHIL # BLD AUTO: 0.22 X10*3/UL (ref 0–0.7)
EOSINOPHIL NFR BLD AUTO: 2.2 %
ERYTHROCYTE [DISTWIDTH] IN BLOOD BY AUTOMATED COUNT: 12.5 % (ref 11.5–14.5)
GFR SERPL CREATININE-BSD FRML MDRD: 74 ML/MIN/1.73M*2
GLUCOSE SERPL-MCNC: 132 MG/DL (ref 74–99)
HCT VFR BLD AUTO: 42 % (ref 41–52)
HGB BLD-MCNC: 14.5 G/DL (ref 13.5–17.5)
IMM GRANULOCYTES # BLD AUTO: 0.04 X10*3/UL (ref 0–0.7)
IMM GRANULOCYTES NFR BLD AUTO: 0.4 % (ref 0–0.9)
LYMPHOCYTES # BLD AUTO: 3.19 X10*3/UL (ref 1.2–4.8)
LYMPHOCYTES NFR BLD AUTO: 31.4 %
MCH RBC QN AUTO: 30.4 PG (ref 26–34)
MCHC RBC AUTO-ENTMCNC: 34.5 G/DL (ref 32–36)
MCV RBC AUTO: 88 FL (ref 80–100)
MONOCYTES # BLD AUTO: 0.69 X10*3/UL (ref 0.1–1)
MONOCYTES NFR BLD AUTO: 6.8 %
NEUTROPHILS # BLD AUTO: 5.95 X10*3/UL (ref 1.2–7.7)
NEUTROPHILS NFR BLD AUTO: 58.5 %
NRBC BLD-RTO: 0 /100 WBCS (ref 0–0)
PLATELET # BLD AUTO: 242 X10*3/UL (ref 150–450)
POTASSIUM SERPL-SCNC: 4.8 MMOL/L (ref 3.5–5.3)
PROT SERPL-MCNC: 6.5 G/DL (ref 6.4–8.2)
PSA SERPL-MCNC: 0.3 NG/ML
RBC # BLD AUTO: 4.77 X10*6/UL (ref 4.5–5.9)
SODIUM SERPL-SCNC: 138 MMOL/L (ref 136–145)
TESTOST SERPL-MCNC: <30 NG/DL (ref 240–1000)
WBC # BLD AUTO: 10.2 X10*3/UL (ref 4.4–11.3)

## 2024-01-03 PROCEDURE — 84403 ASSAY OF TOTAL TESTOSTERONE: CPT

## 2024-01-03 PROCEDURE — 84153 ASSAY OF PSA TOTAL: CPT

## 2024-01-03 PROCEDURE — 36415 COLL VENOUS BLD VENIPUNCTURE: CPT

## 2024-01-03 PROCEDURE — 85025 COMPLETE CBC W/AUTO DIFF WBC: CPT

## 2024-01-03 PROCEDURE — 80053 COMPREHEN METABOLIC PANEL: CPT

## 2024-01-04 ENCOUNTER — DOCUMENTATION (OUTPATIENT)
Dept: HEMATOLOGY/ONCOLOGY | Facility: CLINIC | Age: 54
End: 2024-01-04

## 2024-01-04 ENCOUNTER — OFFICE VISIT (OUTPATIENT)
Dept: HEMATOLOGY/ONCOLOGY | Facility: CLINIC | Age: 54
End: 2024-01-04
Payer: COMMERCIAL

## 2024-01-04 VITALS
SYSTOLIC BLOOD PRESSURE: 138 MMHG | WEIGHT: 284.83 LBS | BODY MASS INDEX: 40.05 KG/M2 | DIASTOLIC BLOOD PRESSURE: 84 MMHG | RESPIRATION RATE: 16 BRPM | HEART RATE: 70 BPM | TEMPERATURE: 97 F | OXYGEN SATURATION: 96 %

## 2024-01-04 DIAGNOSIS — T50.905A HOT FLASH DUE TO MEDICATION: ICD-10-CM

## 2024-01-04 DIAGNOSIS — Z00.6 PATIENT IN CLINICAL RESEARCH STUDY: ICD-10-CM

## 2024-01-04 DIAGNOSIS — C61 PROSTATE CANCER (MULTI): ICD-10-CM

## 2024-01-04 DIAGNOSIS — R23.2 HOT FLASH DUE TO MEDICATION: ICD-10-CM

## 2024-01-04 DIAGNOSIS — C61 PROSTATE CANCER (MULTI): Primary | ICD-10-CM

## 2024-01-04 PROCEDURE — 3075F SYST BP GE 130 - 139MM HG: CPT | Performed by: STUDENT IN AN ORGANIZED HEALTH CARE EDUCATION/TRAINING PROGRAM

## 2024-01-04 PROCEDURE — 3079F DIAST BP 80-89 MM HG: CPT | Performed by: STUDENT IN AN ORGANIZED HEALTH CARE EDUCATION/TRAINING PROGRAM

## 2024-01-04 PROCEDURE — 99215 OFFICE O/P EST HI 40 MIN: CPT | Performed by: STUDENT IN AN ORGANIZED HEALTH CARE EDUCATION/TRAINING PROGRAM

## 2024-01-04 PROCEDURE — 4010F ACE/ARB THERAPY RXD/TAKEN: CPT | Performed by: STUDENT IN AN ORGANIZED HEALTH CARE EDUCATION/TRAINING PROGRAM

## 2024-01-04 PROCEDURE — 1036F TOBACCO NON-USER: CPT | Performed by: STUDENT IN AN ORGANIZED HEALTH CARE EDUCATION/TRAINING PROGRAM

## 2024-01-04 ASSESSMENT — PAIN SCALES - GENERAL: PAINLEVEL: 0-NO PAIN

## 2024-01-04 ASSESSMENT — ENCOUNTER SYMPTOMS
DIARRHEA: 0
FREQUENCY: 0
HEMATURIA: 0
NAUSEA: 0
HEADACHES: 0
APPETITE CHANGE: 0
CONSTIPATION: 0
SHORTNESS OF BREATH: 0
PALPITATIONS: 0
OCCASIONAL FEELINGS OF UNSTEADINESS: 0
VOMITING: 0
HOT FLASHES: 1
BACK PAIN: 0
ABDOMINAL DISTENTION: 0
LOSS OF SENSATION IN FEET: 0
COUGH: 0
CHEST TIGHTNESS: 0
FATIGUE: 0
UNEXPECTED WEIGHT CHANGE: 0
FLANK PAIN: 0
ABDOMINAL PAIN: 0
DEPRESSION: 0
DIZZINESS: 0
DYSURIA: 0
FEVER: 0
DIFFICULTY URINATING: 0

## 2024-01-04 NOTE — PROGRESS NOTES
FOLLOW UP: GENITOURINARY CANCER CLINIC    Diagnosis: High risk prostate cancer (cT3a/ Layne 3+4/ iPSA 6.85)   Oncologic history (see initial consultation note for more detail)    Current therapy: [facility] 1821     Interval history: Iglesia Rojo is a 53 y.o. year old male who presents today to clinic on trial. He has felt well since starting treatment and has no major health changes. He has hot flashes about 2-3 times per day but these do not last long and are manageable without intervention. Energy level and appetite remain good.      ROS: Review of Systems   Constitutional:  Negative for appetite change, fatigue, fever and unexpected weight change.   HENT:   Negative for lump/mass.    Respiratory:  Negative for chest tightness, cough and shortness of breath.    Cardiovascular:  Negative for chest pain and palpitations.   Gastrointestinal:  Negative for abdominal distention, abdominal pain, constipation, diarrhea, nausea and vomiting.   Endocrine: Positive for hot flashes.   Genitourinary:  Negative for bladder incontinence, difficulty urinating, dysuria, frequency, hematuria and nocturia.    Musculoskeletal:  Negative for back pain and flank pain.   Neurological:  Negative for dizziness and headaches.       PE:  Physical Exam  Vitals reviewed.   Constitutional:       Appearance: Normal appearance.   Cardiovascular:      Rate and Rhythm: Normal rate and regular rhythm.      Heart sounds: No murmur heard.     No friction rub. No gallop.   Pulmonary:      Effort: Pulmonary effort is normal.      Breath sounds: Normal breath sounds. No wheezing.   Abdominal:      General: Abdomen is flat. Bowel sounds are normal. There is no distension.      Palpations: Abdomen is soft.      Tenderness: There is no abdominal tenderness.   Musculoskeletal:         General: Normal range of motion.   Skin:     General: Skin is warm and dry.   Neurological:      General: No focal deficit present.      Mental Status: He is  alert. Mental status is at baseline.   Psychiatric:         Mood and Affect: Mood normal.         Behavior: Behavior normal.         Thought Content: Thought content normal.         Judgment: Judgment normal.       ECOG 0    Labs:    Lab Results   Component Value Date    CREATININE 1.17 01/03/2024    BUN 20 01/03/2024     01/03/2024    K 4.8 01/03/2024    CL 98 01/03/2024    CO2 31 01/03/2024     Lab Results   Component Value Date    WBC 10.2 01/03/2024    HGB 14.5 01/03/2024    HCT 42.0 01/03/2024    MCV 88 01/03/2024     01/03/2024     Lab Results   Component Value Date    ALT 15 01/03/2024    AST 18 01/03/2024    ALKPHOS 116 01/03/2024    BILITOT 0.9 01/03/2024     Lab Results   Component Value Date    PSA 0.30 01/03/2024    PSA 5.18 (H) 12/01/2023    PSA 6.99 (H) 11/20/2023         Imaging: Imaging reviewed in prior notes.    Assessment: High risk prostate cancer (cT3a/ [place] 3+4/ iPSA 6.85)     Plan: Iglesia Rojo is a 53 y.o. year old male who presents today to clinic for cycle 2 of [facility] 1821. He overall feels well. PSA is responding to treatment. He has minimal hot flashes but otherwise no other side effects from treatment. He will continue on trial with abiraterone/prednisone and niraparib with no dose adjustments.    Iglesia Rojo understands the plan and has no further questions.     More than 40 minutes were spent in review of records, coordination of care, face-to-face evaluation, and documentation.

## 2024-01-04 NOTE — PROGRESS NOTES
Research Note Treatment Day    Iglesia Rojo is here today for treatment on [facility] 1821. Today is C2D1. Procedures completed per protocol. AE's and con-meds reviewed with patient. Patient is aware of treatment plan.    [x]   Received treatment as planned   OR  []    Treatment delayed; patient calendar updated as required   Treatment delayed because:    []   AE    []   Physician Discretion    []   Clinical Deterioration or Progression     []   Other    Education Documentation  No documentation found.  Education Comments  No comments found.      Patient reports Gr. 1 hot flashes.  Feeling well otherwise.

## 2024-01-08 ENCOUNTER — CLINICAL SUPPORT (OUTPATIENT)
Dept: HEMATOLOGY/ONCOLOGY | Facility: CLINIC | Age: 54
End: 2024-01-08
Payer: COMMERCIAL

## 2024-01-08 VITALS — DIASTOLIC BLOOD PRESSURE: 76 MMHG | HEART RATE: 72 BPM | SYSTOLIC BLOOD PRESSURE: 122 MMHG

## 2024-01-08 NOTE — PROGRESS NOTES
Research Note Non-Treatment Day    Iglesia Rojo is here today. Patient is on [facility] 1821. Today is C2D8. Procedures completed per protocol. AE's and con-meds reviewed with patient. Patient is aware of treatment plan.      Education Documentation  No documentation found.  Education Comments  No comments found.     Patient here today for BP and pulse check.  Patient denies complaints.  Returns on 1/15/24 for vital sign check and labs.

## 2024-01-15 ENCOUNTER — CLINICAL SUPPORT (OUTPATIENT)
Dept: HEMATOLOGY/ONCOLOGY | Facility: CLINIC | Age: 54
End: 2024-01-15
Payer: COMMERCIAL

## 2024-01-15 ENCOUNTER — LAB (OUTPATIENT)
Dept: LAB | Facility: LAB | Age: 54
End: 2024-01-15
Payer: COMMERCIAL

## 2024-01-15 VITALS
RESPIRATION RATE: 20 BRPM | WEIGHT: 283.6 LBS | DIASTOLIC BLOOD PRESSURE: 66 MMHG | HEART RATE: 96 BPM | BODY MASS INDEX: 39.88 KG/M2 | SYSTOLIC BLOOD PRESSURE: 126 MMHG | TEMPERATURE: 95.5 F

## 2024-01-15 DIAGNOSIS — C61 PROSTATE CANCER (MULTI): ICD-10-CM

## 2024-01-15 LAB
ALBUMIN SERPL BCP-MCNC: 4.1 G/DL (ref 3.4–5)
ALP SERPL-CCNC: 123 U/L (ref 33–120)
ALT SERPL W P-5'-P-CCNC: 16 U/L (ref 10–52)
ANION GAP SERPL CALC-SCNC: 11 MMOL/L (ref 10–20)
AST SERPL W P-5'-P-CCNC: 16 U/L (ref 9–39)
BASOPHILS # BLD AUTO: 0.06 X10*3/UL (ref 0–0.1)
BASOPHILS NFR BLD AUTO: 0.5 %
BILIRUB SERPL-MCNC: 1.1 MG/DL (ref 0–1.2)
BUN SERPL-MCNC: 16 MG/DL (ref 6–23)
CALCIUM SERPL-MCNC: 9.5 MG/DL (ref 8.6–10.3)
CHLORIDE SERPL-SCNC: 99 MMOL/L (ref 98–107)
CO2 SERPL-SCNC: 32 MMOL/L (ref 21–32)
CREAT SERPL-MCNC: 1 MG/DL (ref 0.5–1.3)
EGFRCR SERPLBLD CKD-EPI 2021: 89 ML/MIN/1.73M*2
EOSINOPHIL # BLD AUTO: 0.22 X10*3/UL (ref 0–0.7)
EOSINOPHIL NFR BLD AUTO: 2 %
ERYTHROCYTE [DISTWIDTH] IN BLOOD BY AUTOMATED COUNT: 12.8 % (ref 11.5–14.5)
GLUCOSE SERPL-MCNC: 146 MG/DL (ref 74–99)
HCT VFR BLD AUTO: 41.3 % (ref 41–52)
HGB BLD-MCNC: 14.2 G/DL (ref 13.5–17.5)
IMM GRANULOCYTES # BLD AUTO: 0.03 X10*3/UL (ref 0–0.7)
IMM GRANULOCYTES NFR BLD AUTO: 0.3 % (ref 0–0.9)
LYMPHOCYTES # BLD AUTO: 2.77 X10*3/UL (ref 1.2–4.8)
LYMPHOCYTES NFR BLD AUTO: 24.7 %
MCH RBC QN AUTO: 31.3 PG (ref 26–34)
MCHC RBC AUTO-ENTMCNC: 34.4 G/DL (ref 32–36)
MCV RBC AUTO: 91 FL (ref 80–100)
MONOCYTES # BLD AUTO: 0.74 X10*3/UL (ref 0.1–1)
MONOCYTES NFR BLD AUTO: 6.6 %
NEUTROPHILS # BLD AUTO: 7.4 X10*3/UL (ref 1.2–7.7)
NEUTROPHILS NFR BLD AUTO: 65.9 %
NRBC BLD-RTO: 0 /100 WBCS (ref 0–0)
PLATELET # BLD AUTO: 240 X10*3/UL (ref 150–450)
POTASSIUM SERPL-SCNC: 4 MMOL/L (ref 3.5–5.3)
PROT SERPL-MCNC: 6.6 G/DL (ref 6.4–8.2)
RBC # BLD AUTO: 4.54 X10*6/UL (ref 4.5–5.9)
SODIUM SERPL-SCNC: 138 MMOL/L (ref 136–145)
WBC # BLD AUTO: 11.2 X10*3/UL (ref 4.4–11.3)

## 2024-01-15 PROCEDURE — 85025 COMPLETE CBC W/AUTO DIFF WBC: CPT

## 2024-01-15 PROCEDURE — 36415 COLL VENOUS BLD VENIPUNCTURE: CPT

## 2024-01-15 PROCEDURE — 80053 COMPREHEN METABOLIC PANEL: CPT

## 2024-01-15 NOTE — PROGRESS NOTES
Research Note Non-Treatment Day    Iglesia Rojo is here today. Patient is on [facility] 1821. Today is C2D15. Procedures completed per protocol. AE's and con-meds reviewed with patient. Patient is aware of treatment plan.      Education Documentation  No documentation found.  Education Comments  No comments found.     Patient denies complaints.  Feeling well.  Patient will return next week for vital sign check.

## 2024-01-18 DIAGNOSIS — C61 PROSTATE CANCER (MULTI): Primary | ICD-10-CM

## 2024-01-19 ENCOUNTER — OFFICE VISIT (OUTPATIENT)
Dept: PRIMARY CARE | Facility: CLINIC | Age: 54
End: 2024-01-19
Payer: COMMERCIAL

## 2024-01-19 VITALS
WEIGHT: 288 LBS | HEIGHT: 70 IN | SYSTOLIC BLOOD PRESSURE: 138 MMHG | DIASTOLIC BLOOD PRESSURE: 82 MMHG | RESPIRATION RATE: 20 BRPM | BODY MASS INDEX: 41.23 KG/M2 | HEART RATE: 92 BPM | TEMPERATURE: 97.3 F

## 2024-01-19 DIAGNOSIS — E78.2 MIXED HYPERLIPIDEMIA: Primary | ICD-10-CM

## 2024-01-19 DIAGNOSIS — Z13.31 SCREENING FOR DEPRESSION: ICD-10-CM

## 2024-01-19 DIAGNOSIS — J44.9 CHRONIC OBSTRUCTIVE PULMONARY DISEASE, UNSPECIFIED COPD TYPE (MULTI): ICD-10-CM

## 2024-01-19 DIAGNOSIS — C61 PROSTATE CANCER (MULTI): ICD-10-CM

## 2024-01-19 DIAGNOSIS — Z12.11 SCREEN FOR COLON CANCER: ICD-10-CM

## 2024-01-19 DIAGNOSIS — F41.9 ANXIETY: ICD-10-CM

## 2024-01-19 DIAGNOSIS — I10 PRIMARY HYPERTENSION: ICD-10-CM

## 2024-01-19 DIAGNOSIS — E66.01 MORBID OBESITY WITH BMI OF 40.0-44.9, ADULT (MULTI): ICD-10-CM

## 2024-01-19 DIAGNOSIS — E11.9 TYPE 2 DIABETES MELLITUS WITHOUT COMPLICATION, WITHOUT LONG-TERM CURRENT USE OF INSULIN (MULTI): ICD-10-CM

## 2024-01-19 PROBLEM — I20.9 ANGINA PECTORIS (CMS-HCC): Status: RESOLVED | Noted: 2023-05-16 | Resolved: 2024-01-19

## 2024-01-19 PROCEDURE — 96127 BRIEF EMOTIONAL/BEHAV ASSMT: CPT | Performed by: FAMILY MEDICINE

## 2024-01-19 PROCEDURE — 99214 OFFICE O/P EST MOD 30 MIN: CPT | Performed by: FAMILY MEDICINE

## 2024-01-19 RX ORDER — HYDROCHLOROTHIAZIDE 12.5 MG/1
12.5 TABLET ORAL DAILY
Qty: 90 TABLET | Refills: 3 | Status: SHIPPED | OUTPATIENT
Start: 2024-01-19

## 2024-01-19 RX ORDER — ATORVASTATIN CALCIUM 20 MG/1
20 TABLET, FILM COATED ORAL DAILY
Qty: 90 TABLET | Refills: 3 | Status: SHIPPED | OUTPATIENT
Start: 2024-01-19

## 2024-01-19 RX ORDER — PROPRANOLOL HYDROCHLORIDE 10 MG/1
10 TABLET ORAL 3 TIMES DAILY PRN
Qty: 90 TABLET | Refills: 0 | Status: SHIPPED | OUTPATIENT
Start: 2024-01-19 | End: 2024-02-26 | Stop reason: WASHOUT

## 2024-01-19 RX ORDER — LISINOPRIL 40 MG/1
40 TABLET ORAL DAILY
Qty: 90 TABLET | Refills: 3 | Status: SHIPPED | OUTPATIENT
Start: 2024-01-19

## 2024-01-19 RX ORDER — PROPRANOLOL HYDROCHLORIDE 10 MG/1
10 TABLET ORAL EVERY 8 HOURS PRN
Qty: 30 TABLET | Refills: 1 | Status: SHIPPED | OUTPATIENT
Start: 2024-01-19 | End: 2024-01-19 | Stop reason: SDUPTHER

## 2024-01-19 ASSESSMENT — PATIENT HEALTH QUESTIONNAIRE - PHQ9
10. IF YOU CHECKED OFF ANY PROBLEMS, HOW DIFFICULT HAVE THESE PROBLEMS MADE IT FOR YOU TO DO YOUR WORK, TAKE CARE OF THINGS AT HOME, OR GET ALONG WITH OTHER PEOPLE: NOT DIFFICULT AT ALL
1. LITTLE INTEREST OR PLEASURE IN DOING THINGS: SEVERAL DAYS
SUM OF ALL RESPONSES TO PHQ9 QUESTIONS 1 AND 2: 2
2. FEELING DOWN, DEPRESSED OR HOPELESS: SEVERAL DAYS

## 2024-01-19 ASSESSMENT — ENCOUNTER SYMPTOMS
DYSPHORIC MOOD: 0
CONSTIPATION: 0
COUGH: 0
FEVER: 0
NAUSEA: 0
HEMATURIA: 0
WHEEZING: 0
FATIGUE: 0
VOMITING: 0
DIARRHEA: 0
RHINORRHEA: 0
BLOOD IN STOOL: 0
NERVOUS/ANXIOUS: 1
DIFFICULTY URINATING: 0
BRUISES/BLEEDS EASILY: 0
SORE THROAT: 0
PALPITATIONS: 0
SHORTNESS OF BREATH: 0

## 2024-01-19 NOTE — PROGRESS NOTES
"Subjective   Patient ID: Iglesia Rojo is a 53 y.o. male who presents for Follow-up (Pt needs Rfs. Pt wants to know if he should still be taking propranolol. Meds are qued up for refill except the propranolol. ).    HPI     Review of Systems   Constitutional:  Negative for fatigue and fever.        Pt needs all meds rf.   HENT:  Negative for congestion, ear pain, hearing loss, rhinorrhea and sore throat.    Eyes:  Negative for visual disturbance.   Respiratory:  Negative for cough, shortness of breath and wheezing.    Cardiovascular:  Negative for chest pain and palpitations.   Gastrointestinal:  Negative for blood in stool, constipation, diarrhea, nausea and vomiting.   Genitourinary:  Negative for difficulty urinating, hematuria, scrotal swelling and testicular pain.        Has prostate cancer  Had bx and was entered into experimental study   Hematological:  Does not bruise/bleed easily.   Psychiatric/Behavioral:  Negative for dysphoric mood and suicidal ideas. The patient is nervous/anxious.        Objective   /82   Pulse 92   Temp 36.3 °C (97.3 °F)   Resp 20   Ht 1.778 m (5' 10\")   Wt 131 kg (288 lb)   BMI 41.32 kg/m²     Physical Exam  Vitals and nursing note reviewed.   Constitutional:       General: He is not in acute distress.     Appearance: Normal appearance.   HENT:      Head: Normocephalic and atraumatic.   Cardiovascular:      Rate and Rhythm: Normal rate and regular rhythm.      Heart sounds: Normal heart sounds.   Pulmonary:      Effort: Pulmonary effort is normal.      Breath sounds: Normal breath sounds.   Abdominal:      General: Bowel sounds are normal.      Palpations: Abdomen is soft. There is no mass.      Tenderness: There is no abdominal tenderness.   Musculoskeletal:         General: No deformity.   Skin:     General: Skin is warm and dry.   Neurological:      General: No focal deficit present.      Mental Status: He is alert.   Psychiatric:         Mood and Affect: Mood " normal.         Behavior: Behavior normal.         Assessment/Plan   Problem List Items Addressed This Visit             ICD-10-CM    Chronic obstructive pulmonary disease (CMS/Formerly [facility]) J44.9     Pt on no meds, has been stable         Hyperlipemia - Primary E78.5    Relevant Medications    atorvastatin (Lipitor) 20 mg tablet    Other Relevant Orders    Lipid Panel    TSH with reflex to Free T4 if abnormal    Hypertension I10     Pt on lisinopril , hydrochlorothiazide  Pt precious meds well.  Has been stable         Relevant Medications    hydroCHLOROthiazide (HYDRODiuril) 12.5 mg tablet    lisinopril 40 mg tablet    Diabetes (CMS/[facility]ly [facility]) E11.9     Pt is off medication after bariatric surgery in 8/2022  Monitored.         Relevant Orders    Vitamin D 25-Hydroxy,Total    Anxiety F41.9     Pt takes propranolol as needed         Relevant Medications    propranolol (Inderal) 10 mg tablet    Prostate cancer (CMS/Formerly [facility]) C61     Pt sees onc, rad onc and urol  Pt is in experimental study  Also on lupron         Screening for depression Z13.31    Morbid obesity with BMI of 40.0-44.9, adult (CMS/Formerly [facility]) E66.01, Z68.41     Bariatric surgery 8/2022  Has lost approx 120 #  Ho printed          Other Visit Diagnoses         Codes    Screen for colon cancer     Z12.11    Relevant Orders    Colonoscopy Screening; Average Risk Patient

## 2024-01-22 ENCOUNTER — CLINICAL SUPPORT (OUTPATIENT)
Dept: HEMATOLOGY/ONCOLOGY | Facility: CLINIC | Age: 54
End: 2024-01-22
Payer: COMMERCIAL

## 2024-01-22 VITALS — HEART RATE: 76 BPM | SYSTOLIC BLOOD PRESSURE: 124 MMHG | DIASTOLIC BLOOD PRESSURE: 72 MMHG

## 2024-01-22 NOTE — PROGRESS NOTES
Research Note Non-Treatment Day    Iglesia Rojo is here today. Patient is on BRU[facility] 1821. Today is C2D22. Procedures completed per protocol. AE's and con-meds reviewed with patient. Patient is aware of treatment plan.      Education Documentation  No documentation found.  Education Comments  No comments found.     Patient here today for BP and pulse check.  Patient denies complaints.

## 2024-01-25 DIAGNOSIS — C61 PROSTATE CANCER (MULTI): ICD-10-CM

## 2024-01-26 ENCOUNTER — LAB (OUTPATIENT)
Dept: LAB | Facility: LAB | Age: 54
End: 2024-01-26
Payer: COMMERCIAL

## 2024-01-26 DIAGNOSIS — D64.9 ANEMIA, UNSPECIFIED TYPE: ICD-10-CM

## 2024-01-26 DIAGNOSIS — C61 PROSTATE CANCER (MULTI): ICD-10-CM

## 2024-01-26 LAB
ALBUMIN SERPL BCP-MCNC: 3.8 G/DL (ref 3.4–5)
ALP SERPL-CCNC: 102 U/L (ref 33–120)
ALT SERPL W P-5'-P-CCNC: 13 U/L (ref 10–52)
ANION GAP SERPL CALC-SCNC: 12 MMOL/L (ref 10–20)
AST SERPL W P-5'-P-CCNC: 14 U/L (ref 9–39)
BASOPHILS # BLD AUTO: 0.06 X10*3/UL (ref 0–0.1)
BASOPHILS NFR BLD AUTO: 0.6 %
BILIRUB SERPL-MCNC: 1 MG/DL (ref 0–1.2)
BUN SERPL-MCNC: 19 MG/DL (ref 6–23)
CALCIUM SERPL-MCNC: 9.1 MG/DL (ref 8.6–10.3)
CHLORIDE SERPL-SCNC: 101 MMOL/L (ref 98–107)
CO2 SERPL-SCNC: 30 MMOL/L (ref 21–32)
CREAT SERPL-MCNC: 0.95 MG/DL (ref 0.5–1.3)
EGFRCR SERPLBLD CKD-EPI 2021: >90 ML/MIN/1.73M*2
EOSINOPHIL # BLD AUTO: 0.23 X10*3/UL (ref 0–0.7)
EOSINOPHIL NFR BLD AUTO: 2.4 %
ERYTHROCYTE [DISTWIDTH] IN BLOOD BY AUTOMATED COUNT: 13.2 % (ref 11.5–14.5)
GLUCOSE SERPL-MCNC: 121 MG/DL (ref 74–99)
HCT VFR BLD AUTO: 37.4 % (ref 41–52)
HGB BLD-MCNC: 12.6 G/DL (ref 13.5–17.5)
IMM GRANULOCYTES # BLD AUTO: 0.02 X10*3/UL (ref 0–0.7)
IMM GRANULOCYTES NFR BLD AUTO: 0.2 % (ref 0–0.9)
LYMPHOCYTES # BLD AUTO: 2.87 X10*3/UL (ref 1.2–4.8)
LYMPHOCYTES NFR BLD AUTO: 29.6 %
MCH RBC QN AUTO: 30.7 PG (ref 26–34)
MCHC RBC AUTO-ENTMCNC: 33.7 G/DL (ref 32–36)
MCV RBC AUTO: 91 FL (ref 80–100)
MONOCYTES # BLD AUTO: 0.49 X10*3/UL (ref 0.1–1)
MONOCYTES NFR BLD AUTO: 5.1 %
NEUTROPHILS # BLD AUTO: 6.02 X10*3/UL (ref 1.2–7.7)
NEUTROPHILS NFR BLD AUTO: 62.1 %
NRBC BLD-RTO: 0 /100 WBCS (ref 0–0)
PLATELET # BLD AUTO: 220 X10*3/UL (ref 150–450)
POTASSIUM SERPL-SCNC: 4.2 MMOL/L (ref 3.5–5.3)
PROT SERPL-MCNC: 5.9 G/DL (ref 6.4–8.2)
PSA SERPL-MCNC: 0.07 NG/ML
RBC # BLD AUTO: 4.11 X10*6/UL (ref 4.5–5.9)
SODIUM SERPL-SCNC: 139 MMOL/L (ref 136–145)
TESTOST SERPL-MCNC: <30 NG/DL (ref 240–1000)
WBC # BLD AUTO: 9.7 X10*3/UL (ref 4.4–11.3)

## 2024-01-26 PROCEDURE — 83540 ASSAY OF IRON: CPT

## 2024-01-26 PROCEDURE — 80053 COMPREHEN METABOLIC PANEL: CPT

## 2024-01-26 PROCEDURE — 36415 COLL VENOUS BLD VENIPUNCTURE: CPT

## 2024-01-26 PROCEDURE — 85025 COMPLETE CBC W/AUTO DIFF WBC: CPT

## 2024-01-26 PROCEDURE — 84153 ASSAY OF PSA TOTAL: CPT

## 2024-01-26 PROCEDURE — 82607 VITAMIN B-12: CPT

## 2024-01-26 PROCEDURE — 82746 ASSAY OF FOLIC ACID SERUM: CPT

## 2024-01-26 PROCEDURE — 83550 IRON BINDING TEST: CPT

## 2024-01-26 PROCEDURE — 84403 ASSAY OF TOTAL TESTOSTERONE: CPT

## 2024-01-29 ENCOUNTER — LAB (OUTPATIENT)
Dept: LAB | Facility: LAB | Age: 54
End: 2024-01-29
Payer: COMMERCIAL

## 2024-01-29 ENCOUNTER — OFFICE VISIT (OUTPATIENT)
Dept: HEMATOLOGY/ONCOLOGY | Facility: CLINIC | Age: 54
End: 2024-01-29
Payer: COMMERCIAL

## 2024-01-29 ENCOUNTER — DOCUMENTATION (OUTPATIENT)
Dept: HEMATOLOGY/ONCOLOGY | Facility: CLINIC | Age: 54
End: 2024-01-29

## 2024-01-29 ENCOUNTER — TELEPHONE (OUTPATIENT)
Dept: HEMATOLOGY/ONCOLOGY | Facility: CLINIC | Age: 54
End: 2024-01-29

## 2024-01-29 VITALS
OXYGEN SATURATION: 94 % | DIASTOLIC BLOOD PRESSURE: 79 MMHG | TEMPERATURE: 96.8 F | RESPIRATION RATE: 16 BRPM | HEART RATE: 80 BPM | SYSTOLIC BLOOD PRESSURE: 133 MMHG | BODY MASS INDEX: 40.81 KG/M2 | WEIGHT: 284.39 LBS

## 2024-01-29 DIAGNOSIS — C61 PROSTATE CANCER (MULTI): ICD-10-CM

## 2024-01-29 DIAGNOSIS — D64.9 ANEMIA, UNSPECIFIED TYPE: Primary | ICD-10-CM

## 2024-01-29 LAB
FOLATE SERPL-MCNC: 8.5 NG/ML
HOLD SPECIMEN: NORMAL
IRON SATN MFR SERPL: 51 % (ref 25–45)
IRON SERPL-MCNC: 151 UG/DL (ref 35–150)
TIBC SERPL-MCNC: 296 UG/DL (ref 240–445)
UIBC SERPL-MCNC: 145 UG/DL (ref 110–370)
VIT B12 SERPL-MCNC: 398 PG/ML (ref 211–911)

## 2024-01-29 PROCEDURE — 4010F ACE/ARB THERAPY RXD/TAKEN: CPT | Performed by: NURSE PRACTITIONER

## 2024-01-29 PROCEDURE — 3008F BODY MASS INDEX DOCD: CPT | Performed by: NURSE PRACTITIONER

## 2024-01-29 PROCEDURE — 99214 OFFICE O/P EST MOD 30 MIN: CPT | Performed by: NURSE PRACTITIONER

## 2024-01-29 PROCEDURE — 1036F TOBACCO NON-USER: CPT | Performed by: NURSE PRACTITIONER

## 2024-01-29 PROCEDURE — 3075F SYST BP GE 130 - 139MM HG: CPT | Performed by: NURSE PRACTITIONER

## 2024-01-29 PROCEDURE — 3078F DIAST BP <80 MM HG: CPT | Performed by: NURSE PRACTITIONER

## 2024-01-29 ASSESSMENT — ENCOUNTER SYMPTOMS
DEPRESSION: 0
OCCASIONAL FEELINGS OF UNSTEADINESS: 0
LOSS OF SENSATION IN FEET: 0

## 2024-01-29 ASSESSMENT — PAIN SCALES - GENERAL: PAINLEVEL: 0-NO PAIN

## 2024-01-29 NOTE — PROGRESS NOTES
Research Note Treatment Day    Iglesia Rojo is here today for treatment on [facility] 1821.  Today is C3D1. Procedures completed per protocol. AE's and con-meds reviewed with patient. Patient is aware of treatment plan.    [x]   Received treatment as planned   OR  []    Treatment delayed; patient calendar updated as required   Treatment delayed because:    []   AE    []   Physician Discretion    []   Clinical Deterioration or Progression     []   Other    Education Documentation  No documentation found.  Education Comments  No comments found.      Patient reports gr. 1 hot flashes.  Denies other complaints.  Patient to return in 2 weeks for labs and vital signs.

## 2024-01-29 NOTE — PROGRESS NOTES
Patient ID: Iglesia Rojo is a 53 y.o. male.    PCP: Maame Jeffers DO  Urologist: Enrike Mcgill MD  Radiation Oncologist: Satish Alaniz MD     Diagnosis: High risk prostate cancer (cT3a/ Layne 3+4/ iPSA 6.85)  Current treatment: n/a      Oncologic history:  10/2019: PSA 4.37  5/2023: PSA 6.85; He saw Emma SMITH-CNP. Prostatic exam was unremarkable. Surveillance recommended  8/28/2023: PSA 5.9, free PSA 8%. Recommend prostate biopsy and MRI.   9/11/2023: MRI prostate shows 23.3 g gland, PSA density 0.25, PI-RADS 5 lesion right posterior lateral with irregularity of the prostate capsule concerning for extraprostatic extension. (cT3a)   10/10/2023: Prostate biopsy showed 3+4=7, 9/17 positive cores.   10/23/2023: He saw Enrike Mcgill MD. Prostatic exam: firm R unruly gland. (cT2b). Recommendation of RT over surgery given PSH and BMI.    10/27/2023: PSMA PET showed: 1. Ga68 PSMA avid focus within the right peripheral zone of prostate gland, consistent with biopsy-proven prostate cancer 2. 5 mm mild Ga68 PSMA avid left external iliac node, early gabriel metastasis can not be excluded, attention on follow-up study 3. No PET evidence of bone metastasis  11/8/2023: He saw Satish Alaniz MD who recommended RT followed by ADT vs. Enrollment on BRUCE 1821.   12/4/23 C1, 1/4/24 C2, 1/29/24 C3       Subjective    HPI  Energy and appetite are okay.  Denies cough/fever/sob.  Denies n/v.   Denies constipation/diarrhea/blood stool.   Denies dysuria/hematuria.  +hot flashes, but not too often/significant.   Denies pain that is new or unusual.      Objective    BSA: 2.52 meters squared  /79 (BP Location: Right arm, Patient Position: Sitting)   Pulse 80   Temp 36 °C (96.8 °F) (Temporal)   Resp 16   Wt 129 kg (284 lb 6.3 oz)   SpO2 94%   BMI 40.81 kg/m²      Physical Exam  Vitals reviewed.   Constitutional:       General: He is not in acute distress.  Eyes:      General: No scleral icterus.  Cardiovascular:      Rate  and Rhythm: Normal rate and regular rhythm.   Pulmonary:      Effort: Pulmonary effort is normal. No respiratory distress.      Breath sounds: Normal breath sounds. No wheezing or rales.   Abdominal:      General: Bowel sounds are normal. There is no distension.      Palpations: Abdomen is soft.      Tenderness: There is no abdominal tenderness. There is no guarding.      Comments: obese   Musculoskeletal:      Right lower leg: No edema.      Left lower leg: No edema.      Comments: Ambulates independently    Lymphadenopathy:      Cervical: No cervical adenopathy.   Skin:     General: Skin is warm and dry.   Neurological:      Mental Status: He is alert and oriented to person, place, and time.   Psychiatric:         Mood and Affect: Mood normal.         Behavior: Behavior normal.         Performance Status:  Asymptomatic    Lab Results   Component Value Date    PSA 0.07 01/26/2024    PSA 0.30 01/03/2024    PSA 5.18 (H) 12/01/2023     Lab Results   Component Value Date    WBC 9.7 01/26/2024    HGB 12.6 (L) 01/26/2024    HCT 37.4 (L) 01/26/2024    MCV 91 01/26/2024     01/26/2024     Lab Results   Component Value Date    GLUCOSE 121 (H) 01/26/2024    CALCIUM 9.1 01/26/2024     01/26/2024    K 4.2 01/26/2024    CO2 30 01/26/2024     01/26/2024    BUN 19 01/26/2024    CREATININE 0.95 01/26/2024     Lab Results   Component Value Date    TESTOSTERONE <30 (L) 01/26/2024     Lab Results   Component Value Date    ALT 13 01/26/2024    AST 14 01/26/2024    ALKPHOS 102 01/26/2024    BILITOT 1.0 01/26/2024      Assessment/Plan   PSA trending down.   He is feeling well overall. Mild hot flashes.   Anemic on these labs. Patient states he took iron after surgery for 12 days. Denies blood in the urine or stool.   Will add on iron studies/B12/folate.     Per research nurse, he will get labs again in 2 weeks.     Encouraged exercise. Aim for 150 min of exercise per week.     Radiation planning is scheduled for next  month.     Scheduled for next ADT injection 3/1, 3 mos leuprolide.             Oncology History   Prostate cancer (CMS/HCC)   10/23/2023 Initial Diagnosis    Prostate cancer (CMS/HCC)     10/23/2023 Cancer Staged    Staging form: Prostate, AJCC 8th Edition, Clinical: cT3a, cN0, PSA: 6.9, Grade Group: 2 - Signed by Enrike Mcgill MD on 10/23/2023     12/28/2023 -  Chemotherapy    ([facility]) [id number] - Abiraterone / Niraparib with Concurrent Radiation, 28 Day Cycles                 Jen Reid, LUIS-CNP

## 2024-01-29 NOTE — TELEPHONE ENCOUNTER
Called patient to let him know B12, folate, Iron levels were not low. Will continue to monitor cbc. Next check in 2 weeks per research.

## 2024-01-29 NOTE — H&P (VIEW-ONLY)
Patient ID: Iglesia Rojo is a 53 y.o. male.    PCP: Maame Jeffers DO  Urologist: Enrike Mcgill MD  Radiation Oncologist: Satish Alaniz MD     Diagnosis: High risk prostate cancer (cT3a/ [place] 3+4/ iPSA 6.85)  Current treatment: n/a      Oncologic history:  10/2019: PSA 4.37  5/2023: PSA 6.85; He saw Emma SMITH-CNP. Prostatic exam was unremarkable. Surveillance recommended  8/28/2023: PSA 5.9, free PSA 8%. Recommend prostate biopsy and MRI.   9/11/2023: MRI prostate shows 23.3 g gland, PSA density 0.25, PI-RADS 5 lesion right posterior lateral with irregularity of the prostate capsule concerning for extraprostatic extension. (cT3a)   10/10/2023: Prostate biopsy showed 3+4=7, 9/17 positive cores.   10/23/2023: He saw Enrike Mcgill MD. Prostatic exam: firm R unruly gland. (cT2b). Recommendation of RT over surgery given PSH and BMI.    10/27/2023: PSMA PET showed: 1. Ga68 PSMA avid focus within the right peripheral zone of prostate gland, consistent with biopsy-proven prostate cancer 2. 5 mm mild Ga68 PSMA avid left external iliac node, early gabriel metastasis can not be excluded, attention on follow-up study 3. No PET evidence of bone metastasis  11/8/2023: He saw Satish Alaniz MD who recommended RT followed by ADT vs. Enrollment on BRUCE 1821.   12/4/23 C1, 1/4/24 C2, 1/29/24 C3       Subjective    HPI  Energy and appetite are okay.  Denies cough/fever/sob.  Denies n/v.   Denies constipation/diarrhea/blood stool.   Denies dysuria/hematuria.  +hot flashes, but not too often/significant.   Denies pain that is new or unusual.      Objective    BSA: 2.52 meters squared  /79 (BP Location: Right arm, Patient Position: Sitting)   Pulse 80   Temp 36 °C (96.8 °F) (Temporal)   Resp 16   Wt 129 kg (284 lb 6.3 oz)   SpO2 94%   BMI 40.81 kg/m²      Physical Exam  Vitals reviewed.   Constitutional:       General: He is not in acute distress.  Eyes:      General: No scleral icterus.  Cardiovascular:      Rate  and Rhythm: Normal rate and regular rhythm.   Pulmonary:      Effort: Pulmonary effort is normal. No respiratory distress.      Breath sounds: Normal breath sounds. No wheezing or rales.   Abdominal:      General: Bowel sounds are normal. There is no distension.      Palpations: Abdomen is soft.      Tenderness: There is no abdominal tenderness. There is no guarding.      Comments: obese   Musculoskeletal:      Right lower leg: No edema.      Left lower leg: No edema.      Comments: Ambulates independently    Lymphadenopathy:      Cervical: No cervical adenopathy.   Skin:     General: Skin is warm and dry.   Neurological:      Mental Status: He is alert and oriented to person, place, and time.   Psychiatric:         Mood and Affect: Mood normal.         Behavior: Behavior normal.         Performance Status:  Asymptomatic    Lab Results   Component Value Date    PSA 0.07 01/26/2024    PSA 0.30 01/03/2024    PSA 5.18 (H) 12/01/2023     Lab Results   Component Value Date    WBC 9.7 01/26/2024    HGB 12.6 (L) 01/26/2024    HCT 37.4 (L) 01/26/2024    MCV 91 01/26/2024     01/26/2024     Lab Results   Component Value Date    GLUCOSE 121 (H) 01/26/2024    CALCIUM 9.1 01/26/2024     01/26/2024    K 4.2 01/26/2024    CO2 30 01/26/2024     01/26/2024    BUN 19 01/26/2024    CREATININE 0.95 01/26/2024     Lab Results   Component Value Date    TESTOSTERONE <30 (L) 01/26/2024     Lab Results   Component Value Date    ALT 13 01/26/2024    AST 14 01/26/2024    ALKPHOS 102 01/26/2024    BILITOT 1.0 01/26/2024      Assessment/Plan   PSA trending down.   He is feeling well overall. Mild hot flashes.   Anemic on these labs. Patient states he took iron after surgery for 12 days. Denies blood in the urine or stool.   Will add on iron studies/B12/folate.     Per research nurse, he will get labs again in 2 weeks.     Encouraged exercise. Aim for 150 min of exercise per week.     Radiation planning is scheduled for next  month.     Scheduled for next ADT injection 3/1, 3 mos leuprolide.             Oncology History   Prostate cancer (CMS/HCC)   10/23/2023 Initial Diagnosis    Prostate cancer (CMS/HCC)     10/23/2023 Cancer Staged    Staging form: Prostate, AJCC 8th Edition, Clinical: cT3a, cN0, PSA: 6.9, Grade Group: 2 - Signed by Enrike Mcgill MD on 10/23/2023     12/28/2023 -  Chemotherapy    ([facility] [id number] - Abiraterone / Niraparib with Concurrent Radiation, 28 Day Cycles                 Jen Reid, LUIS-CNP

## 2024-01-31 DIAGNOSIS — C61 PROSTATE CANCER (MULTI): ICD-10-CM

## 2024-02-12 ENCOUNTER — DOCUMENTATION (OUTPATIENT)
Dept: HEMATOLOGY/ONCOLOGY | Facility: CLINIC | Age: 54
End: 2024-02-12

## 2024-02-12 ENCOUNTER — LAB (OUTPATIENT)
Dept: LAB | Facility: LAB | Age: 54
End: 2024-02-12
Payer: COMMERCIAL

## 2024-02-12 ENCOUNTER — CLINICAL SUPPORT (OUTPATIENT)
Dept: HEMATOLOGY/ONCOLOGY | Facility: CLINIC | Age: 54
End: 2024-02-12
Payer: COMMERCIAL

## 2024-02-12 VITALS — DIASTOLIC BLOOD PRESSURE: 68 MMHG | HEART RATE: 76 BPM | SYSTOLIC BLOOD PRESSURE: 112 MMHG | TEMPERATURE: 96.4 F

## 2024-02-12 DIAGNOSIS — C61 PROSTATE CANCER (MULTI): ICD-10-CM

## 2024-02-12 LAB
ALBUMIN SERPL BCP-MCNC: 4 G/DL (ref 3.4–5)
ALP SERPL-CCNC: 106 U/L (ref 33–120)
ALT SERPL W P-5'-P-CCNC: 15 U/L (ref 10–52)
ANION GAP SERPL CALC-SCNC: 11 MMOL/L (ref 10–20)
AST SERPL W P-5'-P-CCNC: 17 U/L (ref 9–39)
BASOPHILS # BLD AUTO: 0.08 X10*3/UL (ref 0–0.1)
BASOPHILS NFR BLD AUTO: 0.7 %
BILIRUB SERPL-MCNC: 0.8 MG/DL (ref 0–1.2)
BUN SERPL-MCNC: 17 MG/DL (ref 6–23)
CALCIUM SERPL-MCNC: 9.6 MG/DL (ref 8.6–10.3)
CHLORIDE SERPL-SCNC: 99 MMOL/L (ref 98–107)
CO2 SERPL-SCNC: 32 MMOL/L (ref 21–32)
CREAT SERPL-MCNC: 1 MG/DL (ref 0.5–1.3)
EGFRCR SERPLBLD CKD-EPI 2021: 89 ML/MIN/1.73M*2
EOSINOPHIL # BLD AUTO: 0.2 X10*3/UL (ref 0–0.7)
EOSINOPHIL NFR BLD AUTO: 1.6 %
ERYTHROCYTE [DISTWIDTH] IN BLOOD BY AUTOMATED COUNT: 13.5 % (ref 11.5–14.5)
GLUCOSE SERPL-MCNC: 146 MG/DL (ref 74–99)
HCT VFR BLD AUTO: 36.6 % (ref 41–52)
HGB BLD-MCNC: 12.4 G/DL (ref 13.5–17.5)
IMM GRANULOCYTES # BLD AUTO: 0.04 X10*3/UL (ref 0–0.7)
IMM GRANULOCYTES NFR BLD AUTO: 0.3 % (ref 0–0.9)
LYMPHOCYTES # BLD AUTO: 3.23 X10*3/UL (ref 1.2–4.8)
LYMPHOCYTES NFR BLD AUTO: 26.5 %
MCH RBC QN AUTO: 31.2 PG (ref 26–34)
MCHC RBC AUTO-ENTMCNC: 33.9 G/DL (ref 32–36)
MCV RBC AUTO: 92 FL (ref 80–100)
MONOCYTES # BLD AUTO: 0.74 X10*3/UL (ref 0.1–1)
MONOCYTES NFR BLD AUTO: 6.1 %
NEUTROPHILS # BLD AUTO: 7.91 X10*3/UL (ref 1.2–7.7)
NEUTROPHILS NFR BLD AUTO: 64.8 %
NRBC BLD-RTO: 0 /100 WBCS (ref 0–0)
PLATELET # BLD AUTO: 270 X10*3/UL (ref 150–450)
POTASSIUM SERPL-SCNC: 4.9 MMOL/L (ref 3.5–5.3)
PROT SERPL-MCNC: 6.3 G/DL (ref 6.4–8.2)
RBC # BLD AUTO: 3.97 X10*6/UL (ref 4.5–5.9)
SODIUM SERPL-SCNC: 137 MMOL/L (ref 136–145)
WBC # BLD AUTO: 12.2 X10*3/UL (ref 4.4–11.3)

## 2024-02-12 PROCEDURE — 36415 COLL VENOUS BLD VENIPUNCTURE: CPT

## 2024-02-12 PROCEDURE — 80053 COMPREHEN METABOLIC PANEL: CPT

## 2024-02-12 PROCEDURE — 85025 COMPLETE CBC W/AUTO DIFF WBC: CPT

## 2024-02-12 NOTE — PROGRESS NOTES
Research Note Non-Treatment Day    Iglesia Rojo is here today. Patient is on [facility] 1821. Today is C3D15. Procedures completed per protocol. AE's and con-meds reviewed with patient. Patient is aware of treatment plan.      Education Documentation  No documentation found.  Education Comments  No comments found.     Patient without complaints, feeling good.   To return in 2 weeks to see Dr. Nye to start cycle 4.  Patient vital signs today: Temp-35.8, P-76, R-20, BP-112/68 Left arm, sitting position.

## 2024-02-13 ENCOUNTER — ANESTHESIA (OUTPATIENT)
Dept: OPERATING ROOM | Facility: CLINIC | Age: 54
End: 2024-02-13
Payer: COMMERCIAL

## 2024-02-13 ENCOUNTER — ANESTHESIA EVENT (OUTPATIENT)
Dept: OPERATING ROOM | Facility: CLINIC | Age: 54
End: 2024-02-13
Payer: COMMERCIAL

## 2024-02-13 ENCOUNTER — HOSPITAL ENCOUNTER (OUTPATIENT)
Facility: CLINIC | Age: 54
Setting detail: OUTPATIENT SURGERY
Discharge: HOME | End: 2024-02-13
Attending: UROLOGY | Admitting: UROLOGY
Payer: COMMERCIAL

## 2024-02-13 VITALS
WEIGHT: 289.68 LBS | DIASTOLIC BLOOD PRESSURE: 81 MMHG | TEMPERATURE: 97.2 F | BODY MASS INDEX: 40.56 KG/M2 | OXYGEN SATURATION: 99 % | HEART RATE: 87 BPM | HEIGHT: 71 IN | RESPIRATION RATE: 18 BRPM | SYSTOLIC BLOOD PRESSURE: 162 MMHG

## 2024-02-13 DIAGNOSIS — C61 PROSTATE CANCER (MULTI): Primary | ICD-10-CM

## 2024-02-13 PROCEDURE — A55876 PR PLACE RADIOTHER DEVICE/MARKER, PROSTATE: Performed by: ANESTHESIOLOGY

## 2024-02-13 PROCEDURE — 7100000009 HC PHASE TWO TIME - INITIAL BASE CHARGE: Performed by: UROLOGY

## 2024-02-13 PROCEDURE — 2500000004 HC RX 250 GENERAL PHARMACY W/ HCPCS (ALT 636 FOR OP/ED)

## 2024-02-13 PROCEDURE — 3600000004 HC OR TIME - INITIAL BASE CHARGE - PROCEDURE LEVEL FOUR: Performed by: UROLOGY

## 2024-02-13 PROCEDURE — 7100000002 HC RECOVERY ROOM TIME - EACH INCREMENTAL 1 MINUTE: Performed by: UROLOGY

## 2024-02-13 PROCEDURE — 2780000003 HC OR 278 NO HCPCS: Performed by: UROLOGY

## 2024-02-13 PROCEDURE — 55876 PLACE RT DEVICE/MARKER PROS: CPT | Performed by: UROLOGY

## 2024-02-13 PROCEDURE — 7100000010 HC PHASE TWO TIME - EACH INCREMENTAL 1 MINUTE: Performed by: UROLOGY

## 2024-02-13 PROCEDURE — 3700000002 HC GENERAL ANESTHESIA TIME - EACH INCREMENTAL 1 MINUTE: Performed by: UROLOGY

## 2024-02-13 PROCEDURE — 7100000001 HC RECOVERY ROOM TIME - INITIAL BASE CHARGE: Performed by: UROLOGY

## 2024-02-13 PROCEDURE — 3600000009 HC OR TIME - EACH INCREMENTAL 1 MINUTE - PROCEDURE LEVEL FOUR: Performed by: UROLOGY

## 2024-02-13 PROCEDURE — 3700000001 HC GENERAL ANESTHESIA TIME - INITIAL BASE CHARGE: Performed by: UROLOGY

## 2024-02-13 PROCEDURE — 55874 TPRNL PLMT BIODEGRDABL MATRL: CPT | Performed by: UROLOGY

## 2024-02-13 PROCEDURE — C1889 IMPLANT/INSERT DEVICE, NOC: HCPCS | Performed by: UROLOGY

## 2024-02-13 PROCEDURE — A55876 PR PLACE RADIOTHER DEVICE/MARKER, PROSTATE

## 2024-02-13 PROCEDURE — 76872 US TRANSRECTAL: CPT | Performed by: UROLOGY

## 2024-02-13 DEVICE — IMPLANTABLE DEVICE: Type: IMPLANTABLE DEVICE | Site: PROSTATE | Status: FUNCTIONAL

## 2024-02-13 RX ORDER — LIDOCAINE IN NACL,ISO-OSMOT/PF 30 MG/3 ML
0.1 SYRINGE (ML) INJECTION ONCE
Status: DISCONTINUED | OUTPATIENT
Start: 2024-02-13 | End: 2024-02-13 | Stop reason: HOSPADM

## 2024-02-13 RX ORDER — ACETAMINOPHEN 325 MG/1
TABLET ORAL AS NEEDED
Status: DISCONTINUED | OUTPATIENT
Start: 2024-02-13 | End: 2024-02-13

## 2024-02-13 RX ORDER — FENTANYL CITRATE 50 UG/ML
INJECTION, SOLUTION INTRAMUSCULAR; INTRAVENOUS AS NEEDED
Status: DISCONTINUED | OUTPATIENT
Start: 2024-02-13 | End: 2024-02-13

## 2024-02-13 RX ORDER — OXYCODONE HYDROCHLORIDE 5 MG/1
5 TABLET ORAL EVERY 4 HOURS PRN
Status: DISCONTINUED | OUTPATIENT
Start: 2024-02-13 | End: 2024-02-13 | Stop reason: HOSPADM

## 2024-02-13 RX ORDER — DEXAMETHASONE SODIUM PHOSPHATE 4 MG/ML
INJECTION, SOLUTION INTRA-ARTICULAR; INTRALESIONAL; INTRAMUSCULAR; INTRAVENOUS; SOFT TISSUE AS NEEDED
Status: DISCONTINUED | OUTPATIENT
Start: 2024-02-13 | End: 2024-02-13

## 2024-02-13 RX ORDER — ALBUTEROL SULFATE 0.83 MG/ML
2.5 SOLUTION RESPIRATORY (INHALATION) ONCE AS NEEDED
Status: DISCONTINUED | OUTPATIENT
Start: 2024-02-13 | End: 2024-02-13 | Stop reason: HOSPADM

## 2024-02-13 RX ORDER — ONDANSETRON HYDROCHLORIDE 2 MG/ML
INJECTION, SOLUTION INTRAVENOUS AS NEEDED
Status: DISCONTINUED | OUTPATIENT
Start: 2024-02-13 | End: 2024-02-13

## 2024-02-13 RX ORDER — PROPOFOL 10 MG/ML
INJECTION, EMULSION INTRAVENOUS CONTINUOUS PRN
Status: DISCONTINUED | OUTPATIENT
Start: 2024-02-13 | End: 2024-02-13

## 2024-02-13 RX ORDER — PROPOFOL 10 MG/ML
INJECTION, EMULSION INTRAVENOUS AS NEEDED
Status: DISCONTINUED | OUTPATIENT
Start: 2024-02-13 | End: 2024-02-13

## 2024-02-13 RX ORDER — CEFAZOLIN 1 G/1
INJECTION, POWDER, FOR SOLUTION INTRAVENOUS AS NEEDED
Status: DISCONTINUED | OUTPATIENT
Start: 2024-02-13 | End: 2024-02-13

## 2024-02-13 RX ORDER — MIDAZOLAM HYDROCHLORIDE 1 MG/ML
INJECTION, SOLUTION INTRAMUSCULAR; INTRAVENOUS AS NEEDED
Status: DISCONTINUED | OUTPATIENT
Start: 2024-02-13 | End: 2024-02-13

## 2024-02-13 RX ORDER — SODIUM CHLORIDE, SODIUM LACTATE, POTASSIUM CHLORIDE, CALCIUM CHLORIDE 600; 310; 30; 20 MG/100ML; MG/100ML; MG/100ML; MG/100ML
INJECTION, SOLUTION INTRAVENOUS CONTINUOUS PRN
Status: DISCONTINUED | OUTPATIENT
Start: 2024-02-13 | End: 2024-02-13

## 2024-02-13 RX ORDER — SODIUM CHLORIDE 0.9 % (FLUSH) 0.9 %
SYRINGE (ML) INJECTION AS NEEDED
Status: DISCONTINUED | OUTPATIENT
Start: 2024-02-13 | End: 2024-02-13 | Stop reason: HOSPADM

## 2024-02-13 RX ORDER — ONDANSETRON HYDROCHLORIDE 2 MG/ML
4 INJECTION, SOLUTION INTRAVENOUS ONCE AS NEEDED
Status: DISCONTINUED | OUTPATIENT
Start: 2024-02-13 | End: 2024-02-13 | Stop reason: HOSPADM

## 2024-02-13 RX ORDER — GLYCOPYRROLATE 0.2 MG/ML
INJECTION INTRAMUSCULAR; INTRAVENOUS AS NEEDED
Status: DISCONTINUED | OUTPATIENT
Start: 2024-02-13 | End: 2024-02-13

## 2024-02-13 RX ORDER — SODIUM CHLORIDE, SODIUM LACTATE, POTASSIUM CHLORIDE, CALCIUM CHLORIDE 600; 310; 30; 20 MG/100ML; MG/100ML; MG/100ML; MG/100ML
100 INJECTION, SOLUTION INTRAVENOUS CONTINUOUS
Status: DISCONTINUED | OUTPATIENT
Start: 2024-02-13 | End: 2024-02-13 | Stop reason: HOSPADM

## 2024-02-13 RX ADMIN — ONDANSETRON 4 MG: 2 INJECTION INTRAMUSCULAR; INTRAVENOUS at 07:53

## 2024-02-13 RX ADMIN — GLYCOPYRROLATE 0.1 MG: 0.2 INJECTION INTRAMUSCULAR; INTRAVENOUS at 07:40

## 2024-02-13 RX ADMIN — SODIUM CHLORIDE, POTASSIUM CHLORIDE, SODIUM LACTATE AND CALCIUM CHLORIDE: 600; 310; 30; 20 INJECTION, SOLUTION INTRAVENOUS at 07:24

## 2024-02-13 RX ADMIN — ACETAMINOPHEN 975 MG: 325 TABLET ORAL at 07:07

## 2024-02-13 RX ADMIN — MIDAZOLAM 2 MG: 1 INJECTION INTRAMUSCULAR; INTRAVENOUS at 07:24

## 2024-02-13 RX ADMIN — PROPOFOL 150 MG: 10 INJECTION, EMULSION INTRAVENOUS at 07:34

## 2024-02-13 RX ADMIN — CEFAZOLIN 3 G: 1 INJECTION, POWDER, FOR SOLUTION INTRAMUSCULAR; INTRAVENOUS at 07:39

## 2024-02-13 RX ADMIN — PROPOFOL 40 MCG/KG/MIN: 10 INJECTION, EMULSION INTRAVENOUS at 07:39

## 2024-02-13 RX ADMIN — FENTANYL CITRATE 25 MCG: 50 INJECTION, SOLUTION INTRAMUSCULAR; INTRAVENOUS at 07:51

## 2024-02-13 RX ADMIN — FENTANYL CITRATE 50 MCG: 50 INJECTION, SOLUTION INTRAMUSCULAR; INTRAVENOUS at 07:34

## 2024-02-13 RX ADMIN — FENTANYL CITRATE 25 MCG: 50 INJECTION, SOLUTION INTRAMUSCULAR; INTRAVENOUS at 07:50

## 2024-02-13 RX ADMIN — DEXAMETHASONE SODIUM PHOSPHATE 4 MG: 4 INJECTION, SOLUTION INTRAMUSCULAR; INTRAVENOUS at 07:40

## 2024-02-13 RX ADMIN — GLYCOPYRROLATE 0.1 MG: 0.2 INJECTION INTRAMUSCULAR; INTRAVENOUS at 07:32

## 2024-02-13 SDOH — HEALTH STABILITY: MENTAL HEALTH: CURRENT SMOKER: 0

## 2024-02-13 ASSESSMENT — PAIN - FUNCTIONAL ASSESSMENT
PAIN_FUNCTIONAL_ASSESSMENT: 0-10

## 2024-02-13 ASSESSMENT — PAIN SCALES - GENERAL
PAINLEVEL_OUTOF10: 0 - NO PAIN

## 2024-02-13 NOTE — ANESTHESIA POSTPROCEDURE EVALUATION
Patient: Iglesia Rojo    Procedure Summary       Date: 02/13/24 Room / Location: [facility] OR 03 / Virtual [facility]    Anesthesia Start: 0724 Anesthesia Stop: 0803    Procedure: Insertion Fiducial Marker Prostate and SpaceOAR (Perineum) Diagnosis:       Prostate cancer (CMS/HCC)      (Prostate cancer (CMS/HCC) [C61])    Surgeons: Enrike Mcgill MD Responsible Provider: Stewart Vega MD    Anesthesia Type: MAC ASA Status: 2            Anesthesia Type: MAC    Vitals Value Taken Time   /60 02/13/24 0802   Temp 36 °C (96.8 °F) 02/13/24 0802   Pulse 114 02/13/24 0802   Resp 16 02/13/24 0802   SpO2 97 % 02/13/24 0802       Anesthesia Post Evaluation    Patient location during evaluation: bedside  Patient participation: complete - patient participated  Level of consciousness: awake  Pain management: adequate  Airway patency: patent  Cardiovascular status: acceptable  Respiratory status: acceptable  Hydration status: acceptable  Postoperative Nausea and Vomiting: none  Comments: Did well    No notable events documented.

## 2024-02-13 NOTE — OP NOTE
Insertion Fiducial Marker Prostate and SpaceOAR Operative Note     Date: 2024  [facility]    Name: Iglesia Rojo, : 1970, Age: 53 y.o., MRN: [id number], Sex: male    Diagnosis  Pre-op Diagnosis     * Prostate cancer (CMS/HCC) [C61] Post-op Diagnosis     * Prostate cancer (CMS/HCC) [C61]     Procedures  Insertion Fiducial Marker Prostate and SpaceOAR  [id number] - SD PLMT INTERSTITIAL DEV RADIAT TX PROSTATE 1/MULT      Surgeons      * Enrike Mcgill - Primary    Resident/Fellow/Other Assistant:  Surgeon(s) and Role:     * Humberto Lopes MD - Assisting    Procedure Summary  Anesthesia: General  ASA: II  Anesthesia Staff: Anesthesiologist: Stewart Vega MD  C-AA: RADHA Mcknight  Estimated Blood Loss: 3mL  Intra-op Medications: Administrations occurring from 0730 to 0810 on 24:  * No intraprocedure medications in log *           Anesthesia Record               Intraprocedure I/O Totals          Intake    Propofol Drip 0.00 mL    The total shown is the total volume documented since Anesthesia Start was filed.    Total Intake 0 mL          Specimen: No specimens collected     Staff:   Circulator: Adam Mauricio RN  Scrub Person: Christina Alford RN         Drains and/or Catheters: * None in log *    Tourniquet Times:         Implants:  Implants       Type Name Action Serial No.      Other FIDUCIAL MARKER KIT Implanted NA            Operative Indications: 53 year old male w/ dx of prostate cancer who has elected to undergo radiation for primary tratment of the prostate. He presents today for spaceOAR and fiducial placement. The patient was counseled regarding the risks, benefits, alternatives, equipment, and personnel involved and consented to proceed with surgical intervention.    Operative Findings: Good deployment of SpaceOAR with excellent anterior movement of the prostate.  Small prostate, fiducials on the right side stacked together.  Single fiducial in the left mid  gland.  Placement.    Prostate volume 20g    Operative Procedure: The patient was correctly identified and the operative plan was confirmed with the patient and the operative team. A weight appropriate dose of prophylactic antibiotics was administered intravenously prior to the procedure and sequential compression devices were applied to the lower extremities and activated prior to induction of anesthesia. The patient was placed in supine position. Anesthesia was induced. The patient was repositioned in dorsal lithotomy position. All pressure points were padded per protocol.    The operative area was then prepped and draped in the usual sterile fashion.The transrectal ultrasound probe was inserted into the rectum using the stepper device.  [employer] individual fiducials were placed on the right x 2, intended to place them at the base and apex, however ended up stacked together closer to the apex out laterally.  On the left, I placed a single fiducial mid gland.  All fiducials were placed away from the urethra.    Next the SpaceOAR was placed.  The needle provided in the [employer] SpaceOAR kit was used to carefully navigate into the prerectal space.  Hydrodistention was used to confirm entry within the space.  We utilized sagittal as well as transverse ultrasound imaging of the prostate to ensure that we are in the midline with good lateral and vertical mobility.  Once we confirm we are in a good place in the mid gland, we deployed the SpaceOAR gel carefully.  It was excellent dispersal of the SpaceOAR gel which provided good spacing from base to apex as well as laterally.      The patient tolerated the procedure well, emerged from anesthesia without incident, and was transferred to PACU in stable condition. DIEGO (Enrike Mcgill MD) performed the procedure    Enrike Mcgill  Phone Number: [phone]

## 2024-02-13 NOTE — DISCHARGE INSTRUCTIONS
Post-Procedure Instructions for SpaceOAR and Fiducial Marker Placement    Procedure Overview  You have had transperineal SpaceOAR and Fiducial Marker placement.     What to Expect:  Blood-tinged urine with/without clots for 24-72 hours.   Blood in the ejaculate for 4-6 weeks.    When to Call  Call your doctor immediately if you experience any of the following:   You are unable to urinate in 6-8 hours after the procedure.   Fever above 101 degrees (F) or Chills  Passing excessive clots in urine.    Pain control  Tylenol should be adequate.    General Instructions:  Resume normal diet.   Drink 6-8 glasses of water the rest of the day to dilute the urine and to prevent clot formation.   No alcoholic beverages for 24 hours.   No straining or heavy lifting for 5 days.    DR. SHAH'S CONTACT INFORMATION  For non-urgent issues, please send our office a message via [employer], this is often easier and more convenient for you than calling the office. You should have received an invitation to sign-up for [employer] in your email upon registration.    For appointments:  [phone], option 1 -> option 3 -> option 9    For questions/issues/concerns during business hours: [phone] - this number will direct you to the voicemail for Dr. Shah's , which is frequently checked during business hours.     For after-hours issues:  [phone], this will direct you to our answering service or the resident physician on call if needed.     TYLENOL 975 MG TAKEN AT 0700 AM DUE AT 1:00 PM IF NEEDED

## 2024-02-13 NOTE — ANESTHESIA PROCEDURE NOTES
Airway  Date/Time: 2/13/2024 7:35 AM  Urgency: elective    Airway not difficult    Staffing  Performed: RADHA   Authorized by: Stewart Vega MD    Performed by: RADHA Mcknight  Patient location during procedure: OR    Indications and Patient Condition  Indications for airway management: anesthesia  Spontaneous Ventilation: absent  Sedation level: deep  Preoxygenated: yes  Patient position: sniffing  Mask difficulty assessment: 1 - vent by mask  Planned trial extubation    Final Airway Details  Final airway type: supraglottic airway      Successful airway: classic  Size 5     Number of attempts at approach: 1    Additional Comments  Lips/teeth in preanesthetic condition. LMA IGEL 5.

## 2024-02-13 NOTE — ANESTHESIA PREPROCEDURE EVALUATION
Patient: Iglesia Rojo    Procedure Information       Date/Time: 02/13/24 0730    Procedure: Insertion Fiducial Marker Prostate and SpaceOAR - Monitored okay if necessary from anestesia. Needs [employer] and [employer]    Location: [facility] OR 03 / Virtual [facility] OR    Surgeons: Enrike Mcgill MD            Relevant Problems   Anesthesia (within normal limits)      Cardiovascular   (+) Hyperlipemia   (+) Hypertension      Endocrine (within normal limits)      GI   (+) Gastroesophageal reflux disease without esophagitis      /Renal (within normal limits)      Neuro/Psych   (+) Anxiety      Pulmonary   (+) Chronic obstructive pulmonary disease (CMS/HCC)   (+) GOLDY on CPAP      GI/Hepatic (within normal limits)      Hematology (within normal limits)      Musculoskeletal (within normal limits)       Clinical information reviewed:   Tobacco  Allergies  Meds   Med Hx  Surg Hx   Fam Hx  Soc Hx        NPO Detail:  NPO/Void Status  Date of Last Liquid: 02/13/24  Time of Last Liquid: 0600  Date of Last Solid: 02/12/24  Time of Last Solid: 1900         Physical Exam    Airway  Mallampati: II  TM distance: >3 FB  Neck ROM: full     Cardiovascular   Rhythm: regular  Rate: normal     Dental    Pulmonary   Breath sounds clear to auscultation     Abdominal   Abdomen: soft             Anesthesia Plan    History of general anesthesia?: yes  History of complications of general anesthesia?: no    ASA 2     MAC     The patient is not a current smoker.  Patient was not previously instructed to abstain from smoking on day of procedure.  Patient did not smoke on day of procedure.    intravenous induction   Anesthetic plan and risks discussed with patient.    Plan discussed with attending and CAA.

## 2024-02-14 ASSESSMENT — PAIN SCALES - GENERAL: PAINLEVEL_OUTOF10: 0 - NO PAIN

## 2024-02-15 ENCOUNTER — EDUCATION (OUTPATIENT)
Dept: RADIATION ONCOLOGY | Facility: CLINIC | Age: 54
End: 2024-02-15
Payer: COMMERCIAL

## 2024-02-15 NOTE — PROGRESS NOTES
Reviewed with patient bowel and bladder preparations prior to ct simulation and MRI for his prostate.

## 2024-02-21 ENCOUNTER — HOSPITAL ENCOUNTER (OUTPATIENT)
Dept: RADIOLOGY | Facility: CLINIC | Age: 54
Discharge: HOME | End: 2024-02-21
Payer: COMMERCIAL

## 2024-02-21 ENCOUNTER — HOSPITAL ENCOUNTER (OUTPATIENT)
Dept: RADIOLOGY | Facility: EXTERNAL LOCATION | Age: 54
Discharge: HOME | End: 2024-02-21

## 2024-02-21 ENCOUNTER — HOSPITAL ENCOUNTER (OUTPATIENT)
Dept: RADIATION ONCOLOGY | Facility: CLINIC | Age: 54
Setting detail: RADIATION/ONCOLOGY SERIES
Discharge: HOME | End: 2024-02-21
Payer: COMMERCIAL

## 2024-02-21 DIAGNOSIS — C61 PROSTATE CANCER (MULTI): ICD-10-CM

## 2024-02-21 PROCEDURE — 77290 THER RAD SIMULAJ FIELD CPLX: CPT | Performed by: RADIOLOGY

## 2024-02-21 PROCEDURE — 2550000001 HC RX 255 CONTRASTS: Performed by: RADIOLOGY

## 2024-02-21 PROCEDURE — A9575 INJ GADOTERATE MEGLUMI 0.1ML: HCPCS | Performed by: RADIOLOGY

## 2024-02-21 PROCEDURE — 77334 RADIATION TREATMENT AID(S): CPT | Performed by: RADIOLOGY

## 2024-02-21 PROCEDURE — 77263 THER RADIOLOGY TX PLNG CPLX: CPT | Performed by: RADIOLOGY

## 2024-02-21 RX ORDER — GADOTERATE MEGLUMINE 376.9 MG/ML
20 INJECTION INTRAVENOUS
Status: COMPLETED | OUTPATIENT
Start: 2024-02-21 | End: 2024-02-21

## 2024-02-21 RX ADMIN — GADOTERATE MEGLUMINE 20 ML: 376.9 INJECTION INTRAVENOUS at 10:54

## 2024-02-23 ENCOUNTER — LAB (OUTPATIENT)
Dept: LAB | Facility: LAB | Age: 54
End: 2024-02-23
Payer: COMMERCIAL

## 2024-02-23 DIAGNOSIS — C61 PROSTATE CANCER (MULTI): ICD-10-CM

## 2024-02-23 LAB
ALBUMIN SERPL BCP-MCNC: 3.9 G/DL (ref 3.4–5)
ALP SERPL-CCNC: 105 U/L (ref 33–120)
ALT SERPL W P-5'-P-CCNC: 17 U/L (ref 10–52)
ANION GAP SERPL CALC-SCNC: 9 MMOL/L (ref 10–20)
AST SERPL W P-5'-P-CCNC: 18 U/L (ref 9–39)
BASOPHILS # BLD AUTO: 0.05 X10*3/UL (ref 0–0.1)
BASOPHILS NFR BLD AUTO: 0.5 %
BILIRUB SERPL-MCNC: 0.8 MG/DL (ref 0–1.2)
BUN SERPL-MCNC: 15 MG/DL (ref 6–23)
CALCIUM SERPL-MCNC: 9.3 MG/DL (ref 8.6–10.3)
CHLORIDE SERPL-SCNC: 100 MMOL/L (ref 98–107)
CO2 SERPL-SCNC: 32 MMOL/L (ref 21–32)
CREAT SERPL-MCNC: 0.95 MG/DL (ref 0.5–1.3)
EGFRCR SERPLBLD CKD-EPI 2021: >90 ML/MIN/1.73M*2
EOSINOPHIL # BLD AUTO: 0.18 X10*3/UL (ref 0–0.7)
EOSINOPHIL NFR BLD AUTO: 2 %
ERYTHROCYTE [DISTWIDTH] IN BLOOD BY AUTOMATED COUNT: 13.5 % (ref 11.5–14.5)
GLUCOSE SERPL-MCNC: 135 MG/DL (ref 74–99)
HCT VFR BLD AUTO: 34.8 % (ref 41–52)
HGB BLD-MCNC: 11.9 G/DL (ref 13.5–17.5)
IMM GRANULOCYTES # BLD AUTO: 0.02 X10*3/UL (ref 0–0.7)
IMM GRANULOCYTES NFR BLD AUTO: 0.2 % (ref 0–0.9)
LYMPHOCYTES # BLD AUTO: 2.32 X10*3/UL (ref 1.2–4.8)
LYMPHOCYTES NFR BLD AUTO: 25.4 %
MCH RBC QN AUTO: 31.9 PG (ref 26–34)
MCHC RBC AUTO-ENTMCNC: 34.2 G/DL (ref 32–36)
MCV RBC AUTO: 93 FL (ref 80–100)
MONOCYTES # BLD AUTO: 0.55 X10*3/UL (ref 0.1–1)
MONOCYTES NFR BLD AUTO: 6 %
NEUTROPHILS # BLD AUTO: 6 X10*3/UL (ref 1.2–7.7)
NEUTROPHILS NFR BLD AUTO: 65.9 %
NRBC BLD-RTO: 0 /100 WBCS (ref 0–0)
PLATELET # BLD AUTO: 240 X10*3/UL (ref 150–450)
POTASSIUM SERPL-SCNC: 4.4 MMOL/L (ref 3.5–5.3)
PROT SERPL-MCNC: 5.9 G/DL (ref 6.4–8.2)
PSA SERPL-MCNC: 0.03 NG/ML
RBC # BLD AUTO: 3.73 X10*6/UL (ref 4.5–5.9)
SODIUM SERPL-SCNC: 137 MMOL/L (ref 136–145)
TESTOST SERPL-MCNC: <30 NG/DL (ref 240–1000)
WBC # BLD AUTO: 9.1 X10*3/UL (ref 4.4–11.3)

## 2024-02-23 PROCEDURE — 85025 COMPLETE CBC W/AUTO DIFF WBC: CPT

## 2024-02-23 PROCEDURE — 80053 COMPREHEN METABOLIC PANEL: CPT

## 2024-02-23 PROCEDURE — 84403 ASSAY OF TOTAL TESTOSTERONE: CPT

## 2024-02-23 PROCEDURE — 84153 ASSAY OF PSA TOTAL: CPT

## 2024-02-26 ENCOUNTER — DOCUMENTATION (OUTPATIENT)
Dept: HEMATOLOGY/ONCOLOGY | Facility: CLINIC | Age: 54
End: 2024-02-26

## 2024-02-26 ENCOUNTER — OFFICE VISIT (OUTPATIENT)
Dept: HEMATOLOGY/ONCOLOGY | Facility: CLINIC | Age: 54
End: 2024-02-26
Payer: COMMERCIAL

## 2024-02-26 VITALS
RESPIRATION RATE: 16 BRPM | HEART RATE: 89 BPM | OXYGEN SATURATION: 94 % | SYSTOLIC BLOOD PRESSURE: 145 MMHG | DIASTOLIC BLOOD PRESSURE: 88 MMHG | BODY MASS INDEX: 39.79 KG/M2 | TEMPERATURE: 97.7 F | WEIGHT: 285.27 LBS

## 2024-02-26 DIAGNOSIS — C61 PROSTATE CANCER (MULTI): Primary | ICD-10-CM

## 2024-02-26 PROCEDURE — 3079F DIAST BP 80-89 MM HG: CPT | Performed by: INTERNAL MEDICINE

## 2024-02-26 PROCEDURE — 3077F SYST BP >= 140 MM HG: CPT | Performed by: INTERNAL MEDICINE

## 2024-02-26 PROCEDURE — 1036F TOBACCO NON-USER: CPT | Performed by: INTERNAL MEDICINE

## 2024-02-26 PROCEDURE — 3008F BODY MASS INDEX DOCD: CPT | Performed by: INTERNAL MEDICINE

## 2024-02-26 PROCEDURE — 4010F ACE/ARB THERAPY RXD/TAKEN: CPT | Performed by: INTERNAL MEDICINE

## 2024-02-26 PROCEDURE — 99215 OFFICE O/P EST HI 40 MIN: CPT | Performed by: INTERNAL MEDICINE

## 2024-02-26 ASSESSMENT — ENCOUNTER SYMPTOMS
DIZZINESS: 0
HEADACHES: 0
FLANK PAIN: 0
CHEST TIGHTNESS: 0
DIARRHEA: 0
DIFFICULTY URINATING: 0
LIGHT-HEADEDNESS: 0
COUGH: 0
HOT FLASHES: 0
PALPITATIONS: 0
LEG SWELLING: 0
FATIGUE: 0
CONSTIPATION: 0
FREQUENCY: 0
VOMITING: 0
SEIZURES: 0
SHORTNESS OF BREATH: 0
ADENOPATHY: 0
UNEXPECTED WEIGHT CHANGE: 0
NERVOUS/ANXIOUS: 0
BACK PAIN: 0
CHILLS: 0
APPETITE CHANGE: 0
FEVER: 0
HEMATURIA: 0
BRUISES/BLEEDS EASILY: 0
ABDOMINAL DISTENTION: 0
NUMBNESS: 0
NAUSEA: 0
DYSURIA: 0
DIAPHORESIS: 0
ABDOMINAL PAIN: 0

## 2024-02-26 ASSESSMENT — PAIN SCALES - GENERAL: PAINLEVEL: 0-NO PAIN

## 2024-02-26 NOTE — PROGRESS NOTES
FOLLOW UP: GENITOURINARY CANCER CLINIC    Diagnosis: High risk prostate cancer (cT3a/ [place]ne 3+4/ iPSA 6.85)   Oncologic history (see initial consultation note for more detail)    Current therapy: [facility] 1821 (C1 start 11/30/2023)    Interval history: Iglesia Rojo is a 53 y.o. year old male who presents today to clinic for fu on BRUCE 1821. He is due for cycle 4.  Hot flashes are intermittent and do not affect his daily life. He has no side effects to report. He feels at baseline.     ROS: Review of Systems   Constitutional:  Negative for appetite change, chills, diaphoresis, fatigue, fever and unexpected weight change.   HENT:   Negative for lump/mass.    Respiratory:  Negative for chest tightness, cough and shortness of breath.    Cardiovascular:  Negative for chest pain, leg swelling and palpitations.   Gastrointestinal:  Negative for abdominal distention, abdominal pain, constipation, diarrhea, nausea and vomiting.   Endocrine: Negative for hot flashes.   Genitourinary:  Negative for bladder incontinence, difficulty urinating, dyspareunia, dysuria, frequency, hematuria and nocturia.    Musculoskeletal:  Negative for back pain, flank pain and gait problem.   Skin:  Negative for rash.   Neurological:  Negative for dizziness, gait problem, headaches, light-headedness, numbness and seizures.   Hematological:  Negative for adenopathy. Does not bruise/bleed easily.   Psychiatric/Behavioral:  The patient is not nervous/anxious.        PE:  Physical Exam  Vitals reviewed.   Constitutional:       Appearance: Normal appearance.   HENT:      Head: Normocephalic and atraumatic.      Nose: Nose normal.      Mouth/Throat:      Mouth: Mucous membranes are moist.      Pharynx: Oropharynx is clear.   Eyes:      Extraocular Movements: Extraocular movements intact.      Conjunctiva/sclera: Conjunctivae normal.   Cardiovascular:      Rate and Rhythm: Normal rate and regular rhythm.   Pulmonary:      Effort: Pulmonary  effort is normal.      Breath sounds: Normal breath sounds.   Abdominal:      General: Abdomen is flat. Bowel sounds are normal.      Palpations: Abdomen is soft.   Musculoskeletal:         General: Normal range of motion.      Cervical back: Normal range of motion.   Skin:     General: Skin is warm and dry.   Neurological:      General: No focal deficit present.      Mental Status: He is alert and oriented to person, place, and time. Mental status is at baseline.   Psychiatric:         Mood and Affect: Mood normal.         Behavior: Behavior normal.         Thought Content: Thought content normal.         Judgment: Judgment normal.       ECOG 0    Labs:   02/23/24 07:24   GLUCOSE 135 (H)   SODIUM 137   POTASSIUM 4.4   CHLORIDE 100   Bicarbonate 32   Anion Gap 9 (L)   Blood Urea Nitrogen 15   Creatinine 0.95   EGFR >90   Calcium 9.3   Albumin 3.9   Alkaline Phosphatase 105   ALT 17   AST 18   Bilirubin Total 0.8   Total Protein 5.9 (L)      02/23/24 07:24   WBC 9.1   nRBC 0.0   RBC 3.73 (L)   HEMOGLOBIN 11.9 (L)   HEMATOCRIT 34.8 (L)   MCV 93   MCH 31.9   MCHC 34.2   RED CELL DISTRIBUTION WIDTH 13.5   Platelets 240   Neutrophils % 65.9   Immature Granulocytes %, Automated 0.2   Lymphocytes % 25.4   Monocytes % 6.0   Eosinophils % 2.0   Basophils % 0.5   Neutrophils Absolute 6.00   Immature Granulocytes Absolute, Automated 0.02   Lymphocytes Absolute 2.32   Monocytes Absolute 0.55   Eosinophils Absolute 0.18   Basophils Absolute 0.05   EGFR >90      12/01/23 07:05 01/03/24 07:34 01/26/24 07:17 02/23/24 07:24   PSA 5.18 (H) 0.30 0.07 0.03     Assessment: High risk prostate cancer (cT3a/ [place] 3+4/ iPSA 6.85)     Plan: Iglesia Rojo is a 53 y.o. year old male who presents today to clinic for cycle 4 of [facility] 1821. He overall feels well. Labs reviewed.     MARTINA:  Hot flashes- grade 1; intermittent/not daily. Does not interfere with ADLs. Do not suspect hot flashes to worsen as he has been castrate for months now.    Anemia- grade 1; asymptomatic. Related to treatment. We will not work this up for other causes unless he is symptomatic or hgb< 10. We went over symptoms to look out for.     Will continue to monitor MARTINA.     He asked about screening colonoscopies on study- will reach out to the sponsor to find out when.   He completed rad onc simulation on 2/21/2024. Will reach out to Dr. Alaniz that he started C4 today.     RTC C5.     Iglesia Rojo understands the plan and has no further questions.     Juanita Nye MD  Attending Physician  [facility]      [employer] [employer]

## 2024-02-26 NOTE — PROGRESS NOTES
Research Note Treatment Day    Iglesia Rojo is here today for treatment on MICH 1821. Today is C4D1. Procedures completed per protocol. AE's and con-meds reviewed with patient. Patient is aware of treatment plan.    [x]   Received treatment as planned   OR  []    Treatment delayed; patient calendar updated as required   Treatment delayed because:    []   AE    []   Physician Discretion    []   Clinical Deterioration or Progression     []   Other    Education Documentation  No documentation found.  Education Comments  No comments found.      Patient feeling well, reports Gr. 1 Hot Flashes.  Will be starting radiation soon.

## 2024-03-01 ENCOUNTER — INFUSION (OUTPATIENT)
Dept: HEMATOLOGY/ONCOLOGY | Facility: CLINIC | Age: 54
End: 2024-03-01
Payer: COMMERCIAL

## 2024-03-01 VITALS
SYSTOLIC BLOOD PRESSURE: 133 MMHG | BODY MASS INDEX: 39.75 KG/M2 | DIASTOLIC BLOOD PRESSURE: 83 MMHG | HEART RATE: 77 BPM | WEIGHT: 285 LBS | TEMPERATURE: 98.2 F | RESPIRATION RATE: 15 BRPM | OXYGEN SATURATION: 95 %

## 2024-03-01 DIAGNOSIS — C61 PROSTATE CANCER (MULTI): ICD-10-CM

## 2024-03-01 PROCEDURE — 2500000004 HC RX 250 GENERAL PHARMACY W/ HCPCS (ALT 636 FOR OP/ED): Mod: JZ | Performed by: INTERNAL MEDICINE

## 2024-03-01 PROCEDURE — 96402 CHEMO HORMON ANTINEOPL SQ/IM: CPT

## 2024-03-01 PROCEDURE — 96372 THER/PROPH/DIAG INJ SC/IM: CPT | Performed by: INTERNAL MEDICINE

## 2024-03-01 RX ORDER — EPINEPHRINE 0.3 MG/.3ML
0.3 INJECTION SUBCUTANEOUS EVERY 5 MIN PRN
Status: DISCONTINUED | OUTPATIENT
Start: 2024-03-01 | End: 2024-03-01 | Stop reason: HOSPADM

## 2024-03-01 RX ORDER — ALBUTEROL SULFATE 0.83 MG/ML
3 SOLUTION RESPIRATORY (INHALATION) AS NEEDED
Status: DISCONTINUED | OUTPATIENT
Start: 2024-03-01 | End: 2024-03-01 | Stop reason: HOSPADM

## 2024-03-01 RX ORDER — DIPHENHYDRAMINE HYDROCHLORIDE 50 MG/ML
50 INJECTION INTRAMUSCULAR; INTRAVENOUS AS NEEDED
OUTPATIENT
Start: 2024-05-24

## 2024-03-01 RX ORDER — DIPHENHYDRAMINE HYDROCHLORIDE 50 MG/ML
50 INJECTION INTRAMUSCULAR; INTRAVENOUS AS NEEDED
Status: DISCONTINUED | OUTPATIENT
Start: 2024-03-01 | End: 2024-03-01 | Stop reason: HOSPADM

## 2024-03-01 RX ORDER — FAMOTIDINE 10 MG/ML
20 INJECTION INTRAVENOUS ONCE AS NEEDED
Status: DISCONTINUED | OUTPATIENT
Start: 2024-03-01 | End: 2024-03-01 | Stop reason: HOSPADM

## 2024-03-01 RX ORDER — FAMOTIDINE 10 MG/ML
20 INJECTION INTRAVENOUS ONCE AS NEEDED
OUTPATIENT
Start: 2024-05-24

## 2024-03-01 RX ORDER — ALBUTEROL SULFATE 0.83 MG/ML
3 SOLUTION RESPIRATORY (INHALATION) AS NEEDED
OUTPATIENT
Start: 2024-05-24

## 2024-03-01 RX ORDER — EPINEPHRINE 0.3 MG/.3ML
0.3 INJECTION SUBCUTANEOUS EVERY 5 MIN PRN
OUTPATIENT
Start: 2024-05-24

## 2024-03-01 RX ADMIN — LEUPROLIDE ACETATE 22.5 MG: KIT at 10:53

## 2024-03-01 ASSESSMENT — PAIN SCALES - GENERAL: PAINLEVEL: 0-NO PAIN

## 2024-03-06 ENCOUNTER — HOSPITAL ENCOUNTER (OUTPATIENT)
Dept: RADIATION ONCOLOGY | Facility: CLINIC | Age: 54
Setting detail: RADIATION/ONCOLOGY SERIES
Discharge: HOME | End: 2024-03-06
Payer: COMMERCIAL

## 2024-03-06 DIAGNOSIS — C77.5 SECONDARY AND UNSPECIFIED MALIGNANT NEOPLASM OF INTRAPELVIC LYMPH NODES (MULTI): ICD-10-CM

## 2024-03-06 DIAGNOSIS — C61 MALIGNANT NEOPLASM OF PROSTATE (MULTI): ICD-10-CM

## 2024-03-06 LAB
RAD ONC MSQ ACTUAL FRACTIONS DELIVERED: 1
RAD ONC MSQ ACTUAL SESSION DELIVERED DOSE: 800 CGRAY
RAD ONC MSQ ACTUAL TOTAL DOSE: 800 CGRAY
RAD ONC MSQ ELAPSED DAYS: 0
RAD ONC MSQ LAST DATE: NORMAL
RAD ONC MSQ PRESCRIBED FRACTIONAL DOSE: 800 CGRAY
RAD ONC MSQ PRESCRIBED NUMBER OF FRACTIONS: 5
RAD ONC MSQ PRESCRIBED TECHNIQUE: NORMAL
RAD ONC MSQ PRESCRIBED TOTAL DOSE: 4000 CGRAY
RAD ONC MSQ PRESCRIPTION PATTERN COMMENT: NORMAL
RAD ONC MSQ START DATE: NORMAL
RAD ONC MSQ TREATMENT COURSE NUMBER: 1
RAD ONC MSQ TREATMENT SITE: NORMAL

## 2024-03-06 PROCEDURE — 77280 THER RAD SIMULAJ FIELD SMPL: CPT | Performed by: RADIOLOGY

## 2024-03-06 PROCEDURE — 77373 STRTCTC BDY RAD THER TX DLVR: CPT | Performed by: RADIOLOGY

## 2024-03-08 ENCOUNTER — HOSPITAL ENCOUNTER (OUTPATIENT)
Dept: RADIATION ONCOLOGY | Facility: CLINIC | Age: 54
Setting detail: RADIATION/ONCOLOGY SERIES
Discharge: HOME | End: 2024-03-08
Payer: COMMERCIAL

## 2024-03-08 DIAGNOSIS — C61 MALIGNANT NEOPLASM OF PROSTATE (MULTI): ICD-10-CM

## 2024-03-08 DIAGNOSIS — Z51.0 ENCOUNTER FOR ANTINEOPLASTIC RADIATION THERAPY: ICD-10-CM

## 2024-03-08 DIAGNOSIS — C77.5 SECONDARY AND UNSPECIFIED MALIGNANT NEOPLASM OF INTRAPELVIC LYMPH NODES (MULTI): ICD-10-CM

## 2024-03-08 LAB
RAD ONC MSQ ACTUAL FRACTIONS DELIVERED: 2
RAD ONC MSQ ACTUAL SESSION DELIVERED DOSE: 800 CGRAY
RAD ONC MSQ ACTUAL TOTAL DOSE: 1600 CGRAY
RAD ONC MSQ ELAPSED DAYS: 2
RAD ONC MSQ LAST DATE: NORMAL
RAD ONC MSQ PRESCRIBED FRACTIONAL DOSE: 800 CGRAY
RAD ONC MSQ PRESCRIBED NUMBER OF FRACTIONS: 5
RAD ONC MSQ PRESCRIBED TECHNIQUE: NORMAL
RAD ONC MSQ PRESCRIBED TOTAL DOSE: 4000 CGRAY
RAD ONC MSQ PRESCRIPTION PATTERN COMMENT: NORMAL
RAD ONC MSQ START DATE: NORMAL
RAD ONC MSQ TREATMENT COURSE NUMBER: 1
RAD ONC MSQ TREATMENT SITE: NORMAL

## 2024-03-08 PROCEDURE — 77373 STRTCTC BDY RAD THER TX DLVR: CPT | Performed by: RADIOLOGY

## 2024-03-11 ENCOUNTER — HOSPITAL ENCOUNTER (OUTPATIENT)
Dept: RADIATION ONCOLOGY | Facility: CLINIC | Age: 54
Setting detail: RADIATION/ONCOLOGY SERIES
Discharge: HOME | End: 2024-03-11
Payer: COMMERCIAL

## 2024-03-11 DIAGNOSIS — Z51.0 ENCOUNTER FOR ANTINEOPLASTIC RADIATION THERAPY: ICD-10-CM

## 2024-03-11 DIAGNOSIS — C61 MALIGNANT NEOPLASM OF PROSTATE (MULTI): ICD-10-CM

## 2024-03-11 DIAGNOSIS — C77.5 SECONDARY AND UNSPECIFIED MALIGNANT NEOPLASM OF INTRAPELVIC LYMPH NODES (MULTI): ICD-10-CM

## 2024-03-11 LAB
RAD ONC MSQ ACTUAL FRACTIONS DELIVERED: 3
RAD ONC MSQ ACTUAL SESSION DELIVERED DOSE: 800 CGRAY
RAD ONC MSQ ACTUAL TOTAL DOSE: 2400 CGRAY
RAD ONC MSQ ELAPSED DAYS: 5
RAD ONC MSQ LAST DATE: NORMAL
RAD ONC MSQ PRESCRIBED FRACTIONAL DOSE: 800 CGRAY
RAD ONC MSQ PRESCRIBED NUMBER OF FRACTIONS: 5
RAD ONC MSQ PRESCRIBED TECHNIQUE: NORMAL
RAD ONC MSQ PRESCRIBED TOTAL DOSE: 4000 CGRAY
RAD ONC MSQ PRESCRIPTION PATTERN COMMENT: NORMAL
RAD ONC MSQ START DATE: NORMAL
RAD ONC MSQ TREATMENT COURSE NUMBER: 1
RAD ONC MSQ TREATMENT SITE: NORMAL

## 2024-03-11 PROCEDURE — 77373 STRTCTC BDY RAD THER TX DLVR: CPT | Performed by: RADIOLOGY

## 2024-03-13 ENCOUNTER — HOSPITAL ENCOUNTER (OUTPATIENT)
Dept: RADIATION ONCOLOGY | Facility: CLINIC | Age: 54
Setting detail: RADIATION/ONCOLOGY SERIES
Discharge: HOME | End: 2024-03-13
Payer: COMMERCIAL

## 2024-03-13 ENCOUNTER — APPOINTMENT (OUTPATIENT)
Dept: RADIATION ONCOLOGY | Facility: CLINIC | Age: 54
End: 2024-03-13
Payer: COMMERCIAL

## 2024-03-13 DIAGNOSIS — C61 MALIGNANT NEOPLASM OF PROSTATE (MULTI): ICD-10-CM

## 2024-03-13 DIAGNOSIS — Z51.0 ENCOUNTER FOR ANTINEOPLASTIC RADIATION THERAPY: ICD-10-CM

## 2024-03-13 DIAGNOSIS — C77.5 SECONDARY AND UNSPECIFIED MALIGNANT NEOPLASM OF INTRAPELVIC LYMPH NODES (MULTI): ICD-10-CM

## 2024-03-13 LAB
RAD ONC MSQ ACTUAL FRACTIONS DELIVERED: 4
RAD ONC MSQ ACTUAL SESSION DELIVERED DOSE: 800 CGRAY
RAD ONC MSQ ACTUAL TOTAL DOSE: 3200 CGRAY
RAD ONC MSQ ELAPSED DAYS: 7
RAD ONC MSQ LAST DATE: NORMAL
RAD ONC MSQ PRESCRIBED FRACTIONAL DOSE: 800 CGRAY
RAD ONC MSQ PRESCRIBED NUMBER OF FRACTIONS: 5
RAD ONC MSQ PRESCRIBED TECHNIQUE: NORMAL
RAD ONC MSQ PRESCRIBED TOTAL DOSE: 4000 CGRAY
RAD ONC MSQ PRESCRIPTION PATTERN COMMENT: NORMAL
RAD ONC MSQ START DATE: NORMAL
RAD ONC MSQ TREATMENT COURSE NUMBER: 1
RAD ONC MSQ TREATMENT SITE: NORMAL

## 2024-03-13 PROCEDURE — 77336 RADIATION PHYSICS CONSULT: CPT | Performed by: RADIOLOGY

## 2024-03-13 PROCEDURE — 77373 STRTCTC BDY RAD THER TX DLVR: CPT | Performed by: RADIOLOGY

## 2024-03-15 ENCOUNTER — HOSPITAL ENCOUNTER (OUTPATIENT)
Dept: RADIATION ONCOLOGY | Facility: CLINIC | Age: 54
Setting detail: RADIATION/ONCOLOGY SERIES
Discharge: HOME | End: 2024-03-15
Payer: COMMERCIAL

## 2024-03-15 ENCOUNTER — DOCUMENTATION (OUTPATIENT)
Dept: RADIATION ONCOLOGY | Facility: CLINIC | Age: 54
End: 2024-03-15

## 2024-03-15 ENCOUNTER — RADIATION ONCOLOGY OTV (OUTPATIENT)
Dept: RADIATION ONCOLOGY | Facility: CLINIC | Age: 54
End: 2024-03-15
Payer: COMMERCIAL

## 2024-03-15 VITALS — WEIGHT: 289.02 LBS | BODY MASS INDEX: 40.31 KG/M2 | TEMPERATURE: 97 F

## 2024-03-15 DIAGNOSIS — Z51.0 ENCOUNTER FOR ANTINEOPLASTIC RADIATION THERAPY: ICD-10-CM

## 2024-03-15 DIAGNOSIS — C77.5 SECONDARY AND UNSPECIFIED MALIGNANT NEOPLASM OF INTRAPELVIC LYMPH NODES (MULTI): ICD-10-CM

## 2024-03-15 DIAGNOSIS — C61 MALIGNANT NEOPLASM OF PROSTATE (MULTI): ICD-10-CM

## 2024-03-15 LAB
RAD ONC MSQ ACTUAL FRACTIONS DELIVERED: 5
RAD ONC MSQ ACTUAL SESSION DELIVERED DOSE: 800 CGRAY
RAD ONC MSQ ACTUAL TOTAL DOSE: 4000 CGRAY
RAD ONC MSQ ELAPSED DAYS: 9
RAD ONC MSQ LAST DATE: NORMAL
RAD ONC MSQ PRESCRIBED FRACTIONAL DOSE: 800 CGRAY
RAD ONC MSQ PRESCRIBED NUMBER OF FRACTIONS: 5
RAD ONC MSQ PRESCRIBED TECHNIQUE: NORMAL
RAD ONC MSQ PRESCRIBED TOTAL DOSE: 4000 CGRAY
RAD ONC MSQ PRESCRIPTION PATTERN COMMENT: NORMAL
RAD ONC MSQ START DATE: NORMAL
RAD ONC MSQ TREATMENT COURSE NUMBER: 1
RAD ONC MSQ TREATMENT SITE: NORMAL

## 2024-03-15 PROCEDURE — 77435 SBRT MANAGEMENT: CPT | Performed by: RADIOLOGY

## 2024-03-15 PROCEDURE — 77373 STRTCTC BDY RAD THER TX DLVR: CPT | Performed by: RADIOLOGY

## 2024-03-15 ASSESSMENT — PAIN SCALES - GENERAL: PAINLEVEL: 0-NO PAIN

## 2024-03-15 NOTE — PROGRESS NOTES
Radiation Oncology On Treatment Visit    Patient Name:  Iglesia Rojo  MRN:  [id number]  :  1970    Referring Provider: No ref. provider found  Primary Care Provider: Geri Verduzco MD  Care Team: Patient Care Team:  Geri Verduzco MD as PCP - General (Family Medicine)  Juanita Nye MD as Consulting Physician (Hematology and Oncology)  Nino Mane MD PhD as Consulting Physician (Hematology and Oncology)    Date of Service: 3/15/2024     Diagnosis:   Specialty Problems          Radiation Oncology Problems    Prostate cancer (CMS/HCC)         Treatment Summary:  Radiation Treatments       Active   No active radiation treatments to show.     Completed   Prostate+nodes (Started on 3/6/2024)   Most recent fraction: 800 cGy given on 3/15/2024   Total given: 4,000 cGy / 4,000 cGy  (5 of 5 fractions)   Elapsed Days: 9   Technique: SBRT                   SUBJECTIVE: Last radiation treatment today. Tolerated radiation well with minimal to no side effects. Mild fatigue relieved with rest. Denies any urinary symptoms.    OBJECTIVE:   Vital Signs:  Temp 36.1 °C (97 °F)   Wt 131 kg (289 lb 0.4 oz)   BMI 40.31 kg/m²    Pain Scale: The patient's current pain level was assessed.  They report currently having a pain of 0 out of 10.    Other Pertinent Findings:     Toxicity Assessment          3/15/2024    10:44   Toxicity Assessment   Adverse Events Reviewed (WDL) Yes (Within Defined Limits)   Treatment Site Pelvis - male   Anorexia Grade 0   Dehydration Grade 0   Dermatitis Radiation Grade 0   Diarrhea Grade 0   Fatigue Grade 1       mild fatigue relieved with rest. Working   Nausea Grade 0   Pain Grade 0   Constipation Grade 0   Hematuria Grade 0   Urinary Incontinence Grade 0   Urinary Frequency Grade 0   Urinary Retention Grade 0   Urinary Tract Pain Grade 0   Urinary Urgency Grade 0        Assessment / Plan:  The patient is tolerating radiation therapy as anticipated.  Continue per current treatment plan.  RTC  3 months with ALISON Jara. Labs per protocol.

## 2024-03-22 ENCOUNTER — LAB (OUTPATIENT)
Dept: LAB | Facility: LAB | Age: 54
End: 2024-03-22
Payer: COMMERCIAL

## 2024-03-22 DIAGNOSIS — C61 PROSTATE CANCER (MULTI): ICD-10-CM

## 2024-03-22 LAB
ALBUMIN SERPL BCP-MCNC: 4 G/DL (ref 3.4–5)
ALP SERPL-CCNC: 105 U/L (ref 33–120)
ALT SERPL W P-5'-P-CCNC: 13 U/L (ref 10–52)
ANION GAP SERPL CALC-SCNC: 13 MMOL/L (ref 10–20)
AST SERPL W P-5'-P-CCNC: 13 U/L (ref 9–39)
BASOPHILS # BLD AUTO: 0.05 X10*3/UL (ref 0–0.1)
BASOPHILS NFR BLD AUTO: 0.9 %
BILIRUB SERPL-MCNC: 0.8 MG/DL (ref 0–1.2)
BUN SERPL-MCNC: 19 MG/DL (ref 6–23)
CALCIUM SERPL-MCNC: 9.3 MG/DL (ref 8.6–10.3)
CHLORIDE SERPL-SCNC: 99 MMOL/L (ref 98–107)
CO2 SERPL-SCNC: 30 MMOL/L (ref 21–32)
CREAT SERPL-MCNC: 0.97 MG/DL (ref 0.5–1.3)
EGFRCR SERPLBLD CKD-EPI 2021: >90 ML/MIN/1.73M*2
EOSINOPHIL # BLD AUTO: 0.36 X10*3/UL (ref 0–0.7)
EOSINOPHIL NFR BLD AUTO: 6.3 %
ERYTHROCYTE [DISTWIDTH] IN BLOOD BY AUTOMATED COUNT: 13.2 % (ref 11.5–14.5)
GLUCOSE SERPL-MCNC: 130 MG/DL (ref 74–99)
HCT VFR BLD AUTO: 35.7 % (ref 41–52)
HGB BLD-MCNC: 12.2 G/DL (ref 13.5–17.5)
IMM GRANULOCYTES # BLD AUTO: 0.02 X10*3/UL (ref 0–0.7)
IMM GRANULOCYTES NFR BLD AUTO: 0.3 % (ref 0–0.9)
LYMPHOCYTES # BLD AUTO: 1.06 X10*3/UL (ref 1.2–4.8)
LYMPHOCYTES NFR BLD AUTO: 18.4 %
MCH RBC QN AUTO: 32.7 PG (ref 26–34)
MCHC RBC AUTO-ENTMCNC: 34.2 G/DL (ref 32–36)
MCV RBC AUTO: 96 FL (ref 80–100)
MONOCYTES # BLD AUTO: 0.48 X10*3/UL (ref 0.1–1)
MONOCYTES NFR BLD AUTO: 8.3 %
NEUTROPHILS # BLD AUTO: 3.79 X10*3/UL (ref 1.2–7.7)
NEUTROPHILS NFR BLD AUTO: 65.8 %
NRBC BLD-RTO: 0 /100 WBCS (ref 0–0)
PLATELET # BLD AUTO: 175 X10*3/UL (ref 150–450)
POTASSIUM SERPL-SCNC: 4.2 MMOL/L (ref 3.5–5.3)
PROT SERPL-MCNC: 6.1 G/DL (ref 6.4–8.2)
PSA SERPL-MCNC: 0.01 NG/ML
RBC # BLD AUTO: 3.73 X10*6/UL (ref 4.5–5.9)
SODIUM SERPL-SCNC: 138 MMOL/L (ref 136–145)
TESTOST SERPL-MCNC: <30 NG/DL (ref 240–1000)
WBC # BLD AUTO: 5.8 X10*3/UL (ref 4.4–11.3)

## 2024-03-22 PROCEDURE — 80053 COMPREHEN METABOLIC PANEL: CPT

## 2024-03-22 PROCEDURE — 84153 ASSAY OF PSA TOTAL: CPT

## 2024-03-22 PROCEDURE — 84403 ASSAY OF TOTAL TESTOSTERONE: CPT

## 2024-03-22 PROCEDURE — 85025 COMPLETE CBC W/AUTO DIFF WBC: CPT

## 2024-03-22 PROCEDURE — 36415 COLL VENOUS BLD VENIPUNCTURE: CPT

## 2024-03-25 ENCOUNTER — DOCUMENTATION (OUTPATIENT)
Dept: HEMATOLOGY/ONCOLOGY | Facility: CLINIC | Age: 54
End: 2024-03-25

## 2024-03-25 ENCOUNTER — OFFICE VISIT (OUTPATIENT)
Dept: HEMATOLOGY/ONCOLOGY | Facility: CLINIC | Age: 54
End: 2024-03-25
Payer: COMMERCIAL

## 2024-03-25 VITALS
SYSTOLIC BLOOD PRESSURE: 129 MMHG | RESPIRATION RATE: 16 BRPM | BODY MASS INDEX: 39.2 KG/M2 | OXYGEN SATURATION: 97 % | DIASTOLIC BLOOD PRESSURE: 84 MMHG | TEMPERATURE: 96.1 F | HEART RATE: 84 BPM | WEIGHT: 281.09 LBS

## 2024-03-25 DIAGNOSIS — C61 PROSTATE CANCER (MULTI): ICD-10-CM

## 2024-03-25 PROCEDURE — 3008F BODY MASS INDEX DOCD: CPT | Performed by: INTERNAL MEDICINE

## 2024-03-25 PROCEDURE — 99215 OFFICE O/P EST HI 40 MIN: CPT | Performed by: INTERNAL MEDICINE

## 2024-03-25 PROCEDURE — 4010F ACE/ARB THERAPY RXD/TAKEN: CPT | Performed by: INTERNAL MEDICINE

## 2024-03-25 PROCEDURE — 3074F SYST BP LT 130 MM HG: CPT | Performed by: INTERNAL MEDICINE

## 2024-03-25 PROCEDURE — 3079F DIAST BP 80-89 MM HG: CPT | Performed by: INTERNAL MEDICINE

## 2024-03-25 PROCEDURE — 1036F TOBACCO NON-USER: CPT | Performed by: INTERNAL MEDICINE

## 2024-03-25 ASSESSMENT — ENCOUNTER SYMPTOMS
PALPITATIONS: 0
DIZZINESS: 0
DIAPHORESIS: 0
APPETITE CHANGE: 0
COUGH: 0
FEVER: 0
ABDOMINAL DISTENTION: 0
HEMATURIA: 0
FREQUENCY: 0
FLANK PAIN: 0
FATIGUE: 0
CHILLS: 0
ABDOMINAL PAIN: 0
SEIZURES: 0
NUMBNESS: 0
LEG SWELLING: 0
DIARRHEA: 0
BACK PAIN: 0
BRUISES/BLEEDS EASILY: 0
VOMITING: 0
NAUSEA: 0
CHEST TIGHTNESS: 0
SHORTNESS OF BREATH: 0
UNEXPECTED WEIGHT CHANGE: 0
CONSTIPATION: 0
DYSURIA: 0
ADENOPATHY: 0
NERVOUS/ANXIOUS: 0
LIGHT-HEADEDNESS: 0
DIFFICULTY URINATING: 0
HOT FLASHES: 0
HEADACHES: 0

## 2024-03-25 ASSESSMENT — PAIN SCALES - GENERAL: PAINLEVEL: 0-NO PAIN

## 2024-03-25 NOTE — PROGRESS NOTES
FOLLOW UP: GENITOURINARY CANCER CLINIC    Diagnosis: High risk prostate cancer (cT3a/ [place] 3+4/ iPSA 6.85)   Oncologic history (see initial consultation note for more detail)    Current therapy: [facility] 1821 (C1 start 11/30/2023)    Interval history: Iglesia Rojo is a 54 y.o. year old male who presents today to clinic for fu on [facility] 1821. He is due for cycle 6. He completed RT on 3/15/24- has issue with complete voids. He is monitoring it and will fu with Dr. Alaniz if needed,     ROS: Review of Systems   Constitutional:  Negative for appetite change, chills, diaphoresis, fatigue, fever and unexpected weight change.   HENT:   Negative for lump/mass.    Respiratory:  Negative for chest tightness, cough and shortness of breath.    Cardiovascular:  Negative for chest pain, leg swelling and palpitations.   Gastrointestinal:  Negative for abdominal distention, abdominal pain, constipation, diarrhea, nausea and vomiting.   Endocrine: Negative for hot flashes.   Genitourinary:  Negative for bladder incontinence, difficulty urinating, dyspareunia, dysuria, frequency, hematuria and nocturia.    Musculoskeletal:  Negative for back pain, flank pain and gait problem.   Skin:  Negative for rash.   Neurological:  Negative for dizziness, gait problem, headaches, light-headedness, numbness and seizures.   Hematological:  Negative for adenopathy. Does not bruise/bleed easily.   Psychiatric/Behavioral:  The patient is not nervous/anxious.        PE:  Physical Exam  Vitals reviewed.   Constitutional:       Appearance: Normal appearance.   HENT:      Head: Normocephalic and atraumatic.      Nose: Nose normal.      Mouth/Throat:      Mouth: Mucous membranes are moist.      Pharynx: Oropharynx is clear.   Eyes:      Extraocular Movements: Extraocular movements intact.      Conjunctiva/sclera: Conjunctivae normal.   Cardiovascular:      Rate and Rhythm: Normal rate and regular rhythm.   Pulmonary:      Effort: Pulmonary  effort is normal.      Breath sounds: Normal breath sounds.   Abdominal:      General: Abdomen is flat. Bowel sounds are normal.      Palpations: Abdomen is soft.   Musculoskeletal:         General: Normal range of motion.      Cervical back: Normal range of motion.   Skin:     General: Skin is warm and dry.   Neurological:      General: No focal deficit present.      Mental Status: He is alert and oriented to person, place, and time. Mental status is at baseline.   Psychiatric:         Mood and Affect: Mood normal.         Behavior: Behavior normal.         Thought Content: Thought content normal.         Judgment: Judgment normal.       ECOG 0    Labs:   03/22/24 07:02   GLUCOSE 130 (H)   SODIUM 138   POTASSIUM 4.2   CHLORIDE 99   Bicarbonate 30   Anion Gap 13   Blood Urea Nitrogen 19   Creatinine 0.97   EGFR >90   Calcium 9.3   Albumin 4.0   Alkaline Phosphatase 105   ALT 13   AST 13   Bilirubin Total 0.8   Total Protein 6.1 (L)   Testosterone <30 (L)   PSA 0.01   WBC 5.8   nRBC 0.0   RBC 3.73 (L)   HEMOGLOBIN 12.2 (L)   HEMATOCRIT 35.7 (L)   MCV 96   MCH 32.7   MCHC 34.2   RED CELL DISTRIBUTION WIDTH 13.2   Platelets 175   Neutrophils % 65.8   Immature Granulocytes %, Automated 0.3   Lymphocytes % 18.4   Monocytes % 8.3   Eosinophils % 6.3   Basophils % 0.9   Neutrophils Absolute 3.79   Immature Granulocytes Absolute, Automated 0.02   Lymphocytes Absolute 1.06 (L)   Monocytes Absolute 0.48   Eosinophils Absolute 0.36   Basophils Absolute 0.05     Assessment: High risk prostate cancer (cT3a/ [place] 3+4/ iPSA 6.85)     Plan: Iglesia Rojo is a 54 y.o. year old male who presents today to clinic for cycle 5 of [facility] 1821. He overall feels well. Labs reviewed.     MARTINA:  Hot flashes- grade 1; intermittent/not daily. Does not interfere with ADLs. Do not suspect hot flashes to worsen as he has been castrate for months now.   Anemia- grade 1; asymptomatic. Stable.     Will continue to monitor MARTINA.   RTC C6.      Iglesia Rojo understands the plan and has no further questions.     Junaita Nye MD  Attending Physician  [facility]      [facility] [employer]

## 2024-03-25 NOTE — PROGRESS NOTES
Research Note Treatment Day    Iglesia Rojo is here today for treatment on [facility] 1821. Today is C5D1. Procedures completed per protocol. AE's and con-meds reviewed with patient. Patient is aware of treatment plan.    [x]   Received treatment as planned   OR  []    Treatment delayed; patient calendar updated as required   Treatment delayed because:    []   AE    []   Physician Discretion    []   Clinical Deterioration or Progression     []   Other    Education Documentation  No documentation found.  Education Comments  No comments found.    Patient reports gr. 1 urinary frequency.  Denies other complaints.

## 2024-04-16 NOTE — PROGRESS NOTES
Radiation Oncology Treatment Summary    Patient Name:  Iglesia Rojo  MRN:  [id number]  :  1970    Referring Provider: No ref. provider found  Primary Care Provider: Geri Verduzco MD    Brief History: Iglesia Rojo is a 54 y.o. male with Prostate cancer (Multi), Clinical: cT3a, cN0, PSA: 6.9, Grade Group: 2.  The patient completed radiotherapy as outlined below.    Radiation Treatment Summary:    Radiation Treatments       Active   No active radiation treatments to show.     Completed   Prostate+nodes (Started on 3/6/2024)   Most recent fraction: 800 cGy given on 3/15/2024   Total given: 4,000 cGy / 4,000 cGy  (5 of 5 fractions)   Elapsed Days: 9   Technique: SBRT                     Please see the patient's Mosaiq chart for further details regarding the radiation plan, including beam energy.    Concurrent Chemotherapy:  Treatment Plans       Name Type Plan Dates Plan Provider         Active    (RUST) [id number] - Abiraterone / Niraparib with Concurrent Radiation, 28 Day Cycles Oncology Treatment  2023 - Present Juanita Nye MD                    CTCAE Toxicity Overview:   Toxicity Assessment          3/15/2024    10:44   Toxicity Assessment   Adverse Events Reviewed (WDL) Yes (Within Defined Limits)   Treatment Site Pelvis - male   Anorexia Grade 0   Dehydration Grade 0   Dermatitis Radiation Grade 0   Diarrhea Grade 0   Fatigue Grade 1       mild fatigue relieved with rest. Working   Nausea Grade 0   Pain Grade 0   Constipation Grade 0   Hematuria Grade 0   Urinary Incontinence Grade 0   Urinary Frequency Grade 0   Urinary Retention Grade 0   Urinary Tract Pain Grade 0   Urinary Urgency Grade 0     Patient Disposition:  RTC 3 months with ALISON Marek. Labs per protocol.

## 2024-04-17 DIAGNOSIS — C61 PROSTATE CANCER (MULTI): ICD-10-CM

## 2024-04-18 ASSESSMENT — ENCOUNTER SYMPTOMS
CONSTIPATION: 0
DYSURIA: 0
FLANK PAIN: 0
CHILLS: 0
NERVOUS/ANXIOUS: 0
FATIGUE: 0
SHORTNESS OF BREATH: 0
APPETITE CHANGE: 0
HEADACHES: 0
UNEXPECTED WEIGHT CHANGE: 0
BACK PAIN: 0
ABDOMINAL PAIN: 0
ABDOMINAL DISTENTION: 0
NUMBNESS: 0
DIFFICULTY URINATING: 0
COUGH: 0
BRUISES/BLEEDS EASILY: 0
NAUSEA: 0
DIAPHORESIS: 0
DIARRHEA: 0
SEIZURES: 0
HEMATURIA: 0
CHEST TIGHTNESS: 0
HOT FLASHES: 0
DIZZINESS: 0
PALPITATIONS: 0
LEG SWELLING: 0
FREQUENCY: 0
FEVER: 0
VOMITING: 0
LIGHT-HEADEDNESS: 0
ADENOPATHY: 0

## 2024-04-18 NOTE — PROGRESS NOTES
FOLLOW UP: GENITOURINARY CANCER CLINIC    Diagnosis: High risk prostate cancer (cT3a/ [place] 3+4/ iPSA 6.85)   Oncologic history (see initial consultation note for more detail)    Current therapy: [facility] 1821 (C1 start 11/30/2023)    Interval history: Iglesia Rojo is a 54 y.o. year old male who presents today to clinic for fu on [facility]21. He feels well with no MARTINA to report.     ROS: Review of Systems   Constitutional:  Negative for appetite change, chills, diaphoresis, fatigue, fever and unexpected weight change.   HENT:   Negative for lump/mass.    Respiratory:  Negative for chest tightness, cough and shortness of breath.    Cardiovascular:  Negative for chest pain, leg swelling and palpitations.   Gastrointestinal:  Negative for abdominal distention, abdominal pain, constipation, diarrhea, nausea and vomiting.   Endocrine: Negative for hot flashes.   Genitourinary:  Negative for bladder incontinence, difficulty urinating, dyspareunia, dysuria, frequency, hematuria and nocturia.    Musculoskeletal:  Negative for back pain, flank pain and gait problem.   Skin:  Negative for rash.   Neurological:  Negative for dizziness, gait problem, headaches, light-headedness, numbness and seizures.   Hematological:  Negative for adenopathy. Does not bruise/bleed easily.   Psychiatric/Behavioral:  The patient is not nervous/anxious.        PE:  Physical Exam  Vitals reviewed.   Constitutional:       Appearance: Normal appearance.   HENT:      Head: Normocephalic and atraumatic.      Nose: Nose normal.      Mouth/Throat:      Mouth: Mucous membranes are moist.      Pharynx: Oropharynx is clear.   Eyes:      Extraocular Movements: Extraocular movements intact.      Conjunctiva/sclera: Conjunctivae normal.   Cardiovascular:      Rate and Rhythm: Normal rate and regular rhythm.   Pulmonary:      Effort: Pulmonary effort is normal.      Breath sounds: Normal breath sounds.   Abdominal:      General: Abdomen is flat.  Bowel sounds are normal.      Palpations: Abdomen is soft.   Musculoskeletal:         General: Normal range of motion.      Cervical back: Normal range of motion.   Skin:     General: Skin is warm and dry.   Neurological:      General: No focal deficit present.      Mental Status: He is alert and oriented to person, place, and time. Mental status is at baseline.   Psychiatric:         Mood and Affect: Mood normal.         Behavior: Behavior normal.         Thought Content: Thought content normal.         Judgment: Judgment normal.       ECOG 0    Labs:   04/19/24 07:26   GLUCOSE 124 (H)   SODIUM 136   POTASSIUM 4.4   CHLORIDE 100   Bicarbonate 28   Anion Gap 12   Blood Urea Nitrogen 18   Creatinine 1.02   EGFR 87   Calcium 9.2   Albumin 4.1   Alkaline Phosphatase 111   ALT 11   AST 13   Bilirubin Total 0.5   Total Protein 6.2 (L)   Testosterone <30 (L)   PSA <0.01   LEUKOCYTES (10*3/UL) IN BLOOD BY AUTOMATED COUNT, [place] 5.5   nRBC 0.0   ERYTHROCYTES (10*6/UL) IN BLOOD BY AUTOMATED COUNT, [place] 3.48 (L)   HEMOGLOBIN 11.8 (L)   HEMATOCRIT 33.1 (L)   MCV 95   MCH 33.9   MCHC 35.6   RED CELL DISTRIBUTION WIDTH 13.2   PLATELETS (10*3/UL) IN BLOOD AUTOMATED COUNT, [facility] [place] 236   NEUTROPHILS/100 LEUKOCYTES IN BLOOD BY AUTOMATED COUNT, [place] 67.3   Immature Granulocytes %, Automated 0.4   Lymphocytes % 20.8   Monocytes % 6.6   Eosinophils % 4.0   Basophils % 0.9   NEUTROPHILS (10*3/UL) IN BLOOD BY AUTOMATED COUNT, [place] 3.68   Immature Granulocytes Absolute, Automated 0.02   Lymphocytes Absolute 1.14 (L)   Monocytes Absolute 0.36   Eosinophils Absolute 0.22   Basophils Absolute 0.05     Assessment: High risk prostate cancer (cT3a/ [place] 3+4/ iPSA 6.85)     Plan: Iglesia Rojo is a 54 y.o. year old male who presents today to clinic for cycle 5 of [facility]21. He overall feels well. Labs reviewed.     MARTINA:  Hot flashes- resolved  Anemia- grade 1; asymptomatic. Stable.     Will continue to monitor MARTINA.   RTC  1 month for post treatment fu.     Iglesia Rojo understands the plan and has no further questions.     Juanita Nye MD  Attending Physician  [facility]      [facility] [employer]

## 2024-04-19 ENCOUNTER — LAB (OUTPATIENT)
Dept: LAB | Facility: LAB | Age: 54
End: 2024-04-19
Payer: COMMERCIAL

## 2024-04-19 DIAGNOSIS — C61 PROSTATE CANCER (MULTI): ICD-10-CM

## 2024-04-19 LAB
ALBUMIN SERPL BCP-MCNC: 4.1 G/DL (ref 3.4–5)
ALP SERPL-CCNC: 111 U/L (ref 33–120)
ALT SERPL W P-5'-P-CCNC: 11 U/L (ref 10–52)
ANION GAP SERPL CALC-SCNC: 12 MMOL/L (ref 10–20)
AST SERPL W P-5'-P-CCNC: 13 U/L (ref 9–39)
BASOPHILS # BLD AUTO: 0.05 X10*3/UL (ref 0–0.1)
BASOPHILS NFR BLD AUTO: 0.9 %
BILIRUB SERPL-MCNC: 0.5 MG/DL (ref 0–1.2)
BUN SERPL-MCNC: 18 MG/DL (ref 6–23)
CALCIUM SERPL-MCNC: 9.2 MG/DL (ref 8.6–10.3)
CHLORIDE SERPL-SCNC: 100 MMOL/L (ref 98–107)
CO2 SERPL-SCNC: 28 MMOL/L (ref 21–32)
CREAT SERPL-MCNC: 1.02 MG/DL (ref 0.5–1.3)
EGFRCR SERPLBLD CKD-EPI 2021: 87 ML/MIN/1.73M*2
EOSINOPHIL # BLD AUTO: 0.22 X10*3/UL (ref 0–0.7)
EOSINOPHIL NFR BLD AUTO: 4 %
ERYTHROCYTE [DISTWIDTH] IN BLOOD BY AUTOMATED COUNT: 13.2 % (ref 11.5–14.5)
GLUCOSE SERPL-MCNC: 124 MG/DL (ref 74–99)
HCT VFR BLD AUTO: 33.1 % (ref 41–52)
HGB BLD-MCNC: 11.8 G/DL (ref 13.5–17.5)
IMM GRANULOCYTES # BLD AUTO: 0.02 X10*3/UL (ref 0–0.7)
IMM GRANULOCYTES NFR BLD AUTO: 0.4 % (ref 0–0.9)
LYMPHOCYTES # BLD AUTO: 1.14 X10*3/UL (ref 1.2–4.8)
LYMPHOCYTES NFR BLD AUTO: 20.8 %
MCH RBC QN AUTO: 33.9 PG (ref 26–34)
MCHC RBC AUTO-ENTMCNC: 35.6 G/DL (ref 32–36)
MCV RBC AUTO: 95 FL (ref 80–100)
MONOCYTES # BLD AUTO: 0.36 X10*3/UL (ref 0.1–1)
MONOCYTES NFR BLD AUTO: 6.6 %
NEUTROPHILS # BLD AUTO: 3.68 X10*3/UL (ref 1.2–7.7)
NEUTROPHILS NFR BLD AUTO: 67.3 %
NRBC BLD-RTO: 0 /100 WBCS (ref 0–0)
PLATELET # BLD AUTO: 236 X10*3/UL (ref 150–450)
POTASSIUM SERPL-SCNC: 4.4 MMOL/L (ref 3.5–5.3)
PROT SERPL-MCNC: 6.2 G/DL (ref 6.4–8.2)
PSA SERPL-MCNC: <0.01 NG/ML
RBC # BLD AUTO: 3.48 X10*6/UL (ref 4.5–5.9)
SODIUM SERPL-SCNC: 136 MMOL/L (ref 136–145)
TESTOST SERPL-MCNC: <30 NG/DL (ref 240–1000)
WBC # BLD AUTO: 5.5 X10*3/UL (ref 4.4–11.3)

## 2024-04-19 PROCEDURE — 84403 ASSAY OF TOTAL TESTOSTERONE: CPT

## 2024-04-19 PROCEDURE — 36415 COLL VENOUS BLD VENIPUNCTURE: CPT

## 2024-04-19 PROCEDURE — 85025 COMPLETE CBC W/AUTO DIFF WBC: CPT

## 2024-04-19 PROCEDURE — 80053 COMPREHEN METABOLIC PANEL: CPT

## 2024-04-19 PROCEDURE — 84153 ASSAY OF PSA TOTAL: CPT

## 2024-04-22 ENCOUNTER — DOCUMENTATION (OUTPATIENT)
Dept: HEMATOLOGY/ONCOLOGY | Facility: CLINIC | Age: 54
End: 2024-04-22

## 2024-04-22 ENCOUNTER — OFFICE VISIT (OUTPATIENT)
Dept: HEMATOLOGY/ONCOLOGY | Facility: CLINIC | Age: 54
End: 2024-04-22
Payer: COMMERCIAL

## 2024-04-22 ENCOUNTER — LAB (OUTPATIENT)
Dept: LAB | Facility: CLINIC | Age: 54
End: 2024-04-22
Payer: COMMERCIAL

## 2024-04-22 ENCOUNTER — APPOINTMENT (OUTPATIENT)
Dept: HEMATOLOGY/ONCOLOGY | Facility: CLINIC | Age: 54
End: 2024-04-22
Payer: COMMERCIAL

## 2024-04-22 VITALS
BODY MASS INDEX: 38.9 KG/M2 | WEIGHT: 278.88 LBS | TEMPERATURE: 96.4 F | DIASTOLIC BLOOD PRESSURE: 82 MMHG | SYSTOLIC BLOOD PRESSURE: 149 MMHG | OXYGEN SATURATION: 98 % | RESPIRATION RATE: 18 BRPM | HEART RATE: 108 BPM

## 2024-04-22 DIAGNOSIS — C61 PROSTATE CANCER (MULTI): ICD-10-CM

## 2024-04-22 PROCEDURE — 1036F TOBACCO NON-USER: CPT | Performed by: INTERNAL MEDICINE

## 2024-04-22 PROCEDURE — 3079F DIAST BP 80-89 MM HG: CPT | Performed by: INTERNAL MEDICINE

## 2024-04-22 PROCEDURE — 99215 OFFICE O/P EST HI 40 MIN: CPT | Performed by: INTERNAL MEDICINE

## 2024-04-22 PROCEDURE — 36415 COLL VENOUS BLD VENIPUNCTURE: CPT

## 2024-04-22 PROCEDURE — 3077F SYST BP >= 140 MM HG: CPT | Performed by: INTERNAL MEDICINE

## 2024-04-22 PROCEDURE — 4010F ACE/ARB THERAPY RXD/TAKEN: CPT | Performed by: INTERNAL MEDICINE

## 2024-04-22 PROCEDURE — 3008F BODY MASS INDEX DOCD: CPT | Performed by: INTERNAL MEDICINE

## 2024-04-22 ASSESSMENT — PAIN SCALES - GENERAL: PAINLEVEL: 0-NO PAIN

## 2024-04-22 ASSESSMENT — ENCOUNTER SYMPTOMS
LOSS OF SENSATION IN FEET: 0
OCCASIONAL FEELINGS OF UNSTEADINESS: 0
DEPRESSION: 0

## 2024-04-22 NOTE — PROGRESS NOTES
Research Note Treatment Day    Iglesia Rojo is here today for treatment on [facility]21. Today is C6D1. Procedures completed per protocol. AE's and con-meds reviewed with patient. Patient is aware of treatment plan.    [x]   Received treatment as planned   OR  []    Treatment delayed; patient calendar updated as required   Treatment delayed because:    []   AE    []   Physician Discretion    []   Clinical Deterioration or Progression     []   Other    Education Documentation  No documentation found.  Education Comments  No comments found.    Patient to return in May for EOT visit.  Patient denies complaints.

## 2024-05-17 ENCOUNTER — LAB (OUTPATIENT)
Dept: LAB | Facility: CLINIC | Age: 54
End: 2024-05-17
Payer: COMMERCIAL

## 2024-05-17 DIAGNOSIS — C61 PROSTATE CANCER (MULTI): ICD-10-CM

## 2024-05-17 LAB
BASOPHILS # BLD AUTO: 0.02 X10*3/UL (ref 0–0.1)
BASOPHILS NFR BLD AUTO: 0.3 %
EOSINOPHIL # BLD AUTO: 0.16 X10*3/UL (ref 0–0.7)
EOSINOPHIL NFR BLD AUTO: 2.4 %
ERYTHROCYTE [DISTWIDTH] IN BLOOD BY AUTOMATED COUNT: 13.3 % (ref 11.5–14.5)
HCT VFR BLD AUTO: 34.5 % (ref 41–52)
HGB BLD-MCNC: 11.9 G/DL (ref 13.5–17.5)
IMM GRANULOCYTES # BLD AUTO: 0.02 X10*3/UL (ref 0–0.7)
IMM GRANULOCYTES NFR BLD AUTO: 0.3 % (ref 0–0.9)
LYMPHOCYTES # BLD AUTO: 1.07 X10*3/UL (ref 1.2–4.8)
LYMPHOCYTES NFR BLD AUTO: 15.8 %
MCH RBC QN AUTO: 34.7 PG (ref 26–34)
MCHC RBC AUTO-ENTMCNC: 34.5 G/DL (ref 32–36)
MCV RBC AUTO: 101 FL (ref 80–100)
MONOCYTES # BLD AUTO: 0.5 X10*3/UL (ref 0.1–1)
MONOCYTES NFR BLD AUTO: 7.4 %
NEUTROPHILS # BLD AUTO: 4.99 X10*3/UL (ref 1.2–7.7)
NEUTROPHILS NFR BLD AUTO: 73.8 %
NRBC BLD-RTO: ABNORMAL /100{WBCS}
PLATELET # BLD AUTO: 187 X10*3/UL (ref 150–450)
PSA SERPL-MCNC: <0.1 NG/ML
RBC # BLD AUTO: 3.43 X10*6/UL (ref 4.5–5.9)
TESTOST SERPL-MCNC: <30 NG/DL (ref 240–1000)
WBC # BLD AUTO: 6.8 X10*3/UL (ref 4.4–11.3)

## 2024-05-17 PROCEDURE — 36415 COLL VENOUS BLD VENIPUNCTURE: CPT

## 2024-05-17 PROCEDURE — 84153 ASSAY OF PSA TOTAL: CPT

## 2024-05-17 PROCEDURE — 84403 ASSAY OF TOTAL TESTOSTERONE: CPT

## 2024-05-17 PROCEDURE — 85025 COMPLETE CBC W/AUTO DIFF WBC: CPT

## 2024-05-19 ENCOUNTER — APPOINTMENT (OUTPATIENT)
Dept: HEMATOLOGY/ONCOLOGY | Facility: CLINIC | Age: 54
End: 2024-05-19
Payer: COMMERCIAL

## 2024-05-20 ENCOUNTER — OFFICE VISIT (OUTPATIENT)
Dept: HEMATOLOGY/ONCOLOGY | Facility: CLINIC | Age: 54
End: 2024-05-20
Payer: COMMERCIAL

## 2024-05-20 ENCOUNTER — DOCUMENTATION (OUTPATIENT)
Dept: HEMATOLOGY/ONCOLOGY | Facility: CLINIC | Age: 54
End: 2024-05-20

## 2024-05-20 ENCOUNTER — APPOINTMENT (OUTPATIENT)
Dept: UROLOGY | Facility: CLINIC | Age: 54
End: 2024-05-20
Payer: COMMERCIAL

## 2024-05-20 ENCOUNTER — APPOINTMENT (OUTPATIENT)
Dept: HEMATOLOGY/ONCOLOGY | Facility: CLINIC | Age: 54
End: 2024-05-20
Payer: COMMERCIAL

## 2024-05-20 VITALS
WEIGHT: 278.11 LBS | DIASTOLIC BLOOD PRESSURE: 76 MMHG | BODY MASS INDEX: 38.79 KG/M2 | OXYGEN SATURATION: 99 % | TEMPERATURE: 95.4 F | RESPIRATION RATE: 16 BRPM | SYSTOLIC BLOOD PRESSURE: 117 MMHG | HEART RATE: 78 BPM

## 2024-05-20 DIAGNOSIS — C61 PROSTATE CANCER (MULTI): ICD-10-CM

## 2024-05-20 DIAGNOSIS — C61 PROSTATE CANCER (MULTI): Primary | ICD-10-CM

## 2024-05-20 PROCEDURE — 4010F ACE/ARB THERAPY RXD/TAKEN: CPT | Performed by: INTERNAL MEDICINE

## 2024-05-20 PROCEDURE — 3074F SYST BP LT 130 MM HG: CPT | Performed by: INTERNAL MEDICINE

## 2024-05-20 PROCEDURE — 3008F BODY MASS INDEX DOCD: CPT | Performed by: INTERNAL MEDICINE

## 2024-05-20 PROCEDURE — 1036F TOBACCO NON-USER: CPT | Performed by: INTERNAL MEDICINE

## 2024-05-20 PROCEDURE — 3078F DIAST BP <80 MM HG: CPT | Performed by: INTERNAL MEDICINE

## 2024-05-20 PROCEDURE — 99215 OFFICE O/P EST HI 40 MIN: CPT | Performed by: INTERNAL MEDICINE

## 2024-05-20 ASSESSMENT — ENCOUNTER SYMPTOMS
FEVER: 0
CHEST TIGHTNESS: 0
LIGHT-HEADEDNESS: 0
APPETITE CHANGE: 0
ADENOPATHY: 0
PALPITATIONS: 0
UNEXPECTED WEIGHT CHANGE: 0
DIARRHEA: 0
FREQUENCY: 0
SEIZURES: 0
LEG SWELLING: 0
VOMITING: 0
SHORTNESS OF BREATH: 0
CHILLS: 0
NUMBNESS: 0
BRUISES/BLEEDS EASILY: 0
ABDOMINAL DISTENTION: 0
FLANK PAIN: 0
DIAPHORESIS: 0
DIFFICULTY URINATING: 0
COUGH: 0
DIZZINESS: 0
HEADACHES: 0
CONSTIPATION: 0
HOT FLASHES: 0
ABDOMINAL PAIN: 0
NAUSEA: 0
FATIGUE: 0
NERVOUS/ANXIOUS: 0
BACK PAIN: 0
DYSURIA: 0
HEMATURIA: 0

## 2024-05-20 ASSESSMENT — PAIN SCALES - GENERAL: PAINLEVEL: 0-NO PAIN

## 2024-05-20 NOTE — PROGRESS NOTES
Research Note Non-Treatment Day    Iglesia Rojo is here today. Patient is on [facility] 1821. Today is End of treatment visit. Procedures completed per protocol. AE's and con-meds reviewed with patient. Patient is aware of treatment plan.      Education Documentation  No documentation found.  Education Comments  No comments found.     Patient completed treatment on protocol today.  Patient denies complaints.  Patient to return on 8/19 for 3 month follow up visit with Dr. Alaniz.  Patient will have lab work drawn on 8/15.

## 2024-05-20 NOTE — PROGRESS NOTES
FOLLOW UP: GENITOURINARY CANCER CLINIC    Diagnosis: High risk prostate cancer (cT3a/ [place] 3+4/ iPSA 6.85)   Oncologic history (see initial consultation note for more detail)    Current therapy: [facility] 1821 (C1 start 11/30/2023)    Interval history: Iglesia Rojo is a 54 y.o. year old male who presents today to clinic for fu on [facility]21. He feels well with no MARTINA to report.     ROS: Review of Systems   Constitutional:  Negative for appetite change, chills, diaphoresis, fatigue, fever and unexpected weight change.   HENT:   Negative for lump/mass.    Respiratory:  Negative for chest tightness, cough and shortness of breath.    Cardiovascular:  Negative for chest pain, leg swelling and palpitations.   Gastrointestinal:  Negative for abdominal distention, abdominal pain, constipation, diarrhea, nausea and vomiting.   Endocrine: Negative for hot flashes.   Genitourinary:  Negative for bladder incontinence, difficulty urinating, dyspareunia, dysuria, frequency, hematuria and nocturia.    Musculoskeletal:  Negative for back pain, flank pain and gait problem.   Skin:  Negative for rash.   Neurological:  Negative for dizziness, gait problem, headaches, light-headedness, numbness and seizures.   Hematological:  Negative for adenopathy. Does not bruise/bleed easily.   Psychiatric/Behavioral:  The patient is not nervous/anxious.        PE:  Physical Exam  Vitals reviewed.   Constitutional:       Appearance: Normal appearance.   HENT:      Head: Normocephalic and atraumatic.      Nose: Nose normal.      Mouth/Throat:      Mouth: Mucous membranes are moist.      Pharynx: Oropharynx is clear.   Eyes:      Extraocular Movements: Extraocular movements intact.      Conjunctiva/sclera: Conjunctivae normal.   Cardiovascular:      Rate and Rhythm: Normal rate and regular rhythm.   Pulmonary:      Effort: Pulmonary effort is normal.      Breath sounds: Normal breath sounds.   Abdominal:      General: Abdomen is flat.  "Bowel sounds are normal.      Palpations: Abdomen is soft.   Musculoskeletal:         General: Normal range of motion.      Cervical back: Normal range of motion.   Skin:     General: Skin is warm and dry.   Neurological:      General: No focal deficit present.      Mental Status: He is alert and oriented to person, place, and time. Mental status is at baseline.   Psychiatric:         Mood and Affect: Mood normal.         Behavior: Behavior normal.         Thought Content: Thought content normal.         Judgment: Judgment normal.       ECOG 0    Labs:   05/17/24 07:40   LEUKOCYTES (10*3/UL) IN BLOOD BY AUTOMATED COUNT, [facility] 6.8   nRBC COMMENT ONLY   ERYTHROCYTES (10*6/UL) IN BLOOD BY AUTOMATED COUNT, [facility] 3.43 (L)   HEMOGLOBIN 11.9 (L)   HEMATOCRIT 34.5 (L)    (H)   MCH 34.7 (H)   MCHC 34.5   RED CELL DISTRIBUTION WIDTH 13.3   PLATELETS (10*3/UL) IN BLOOD AUTOMATED COUNT, [facility] 187   NEUTROPHILS/100 LEUKOCYTES IN BLOOD BY AUTOMATED COUNT, [facility] 73.8   Immature Granulocytes %, Automated 0.3   Lymphocytes % 15.8   Monocytes % 7.4   Eosinophils % 2.4   Basophils % 0.3   NEUTROPHILS (10*3/UL) IN BLOOD BY AUTOMATED COUNT, [facility] 4.99   Immature Granulocytes Absolute, Automated 0.02   Lymphocytes Absolute 1.07 (L)   Monocytes Absolute 0.50   Eosinophils Absolute 0.16   Basophils Absolute 0.02      12/01/23 07:05 01/03/24 07:34 01/26/24 07:17 02/23/24 07:24 03/22/24 07:02 04/19/24 07:26 05/17/24 07:40   PSA 5.18 (H) 0.30 0.07 0.03 0.01 <0.01 <0.10     Assessment: High risk prostate cancer (cT3a/ [place] 3+4/ iPSA 6.85)     Plan: Iglesia Rojo is a 54 y.o. year old male who presents today to clinic for end of treatment visit for [facility] 1821. He overall feels great. He was concerned by his PSA \"rise.\" We discussed this is not a rise but that the reference range for not-detectable is different at the lab he went to. He is still not detectable. He would like to repeat the lab sooner than the 3 month fu " for peace of mind. I am ok with this. I will order a PSA and testosterone for 6 weeks.     We discussed that it takes time for the testosterone to rise again (on average ~ 1 year). He already feels great so he is not worried about this. We also discussed that once his testosterone rises, we will know how well the treatment worked but in general we look at PSA trends and not isolated values.     He is also concerned that his medical mutual bill is still not resolved. I spoke with our finance department and was reassured that this is covered. I think it is just a delay in processing but that if it is not resolved by next visit- we can reach back out to our finance department.     Myself (or Jen Reid if I am on leave) will FU lab in 6 weeks.   RTC 3 months to see Dr. Alaniz while I am on maternity. I will see him when I return.     Iglesia Rojo understands the plan and has no further questions.     Juanita Nye MD  Attending Physician  [facility]      [facility] [employer]

## 2024-05-24 ENCOUNTER — TELEPHONE (OUTPATIENT)
Dept: RADIATION ONCOLOGY | Facility: CLINIC | Age: 54
End: 2024-05-24
Payer: COMMERCIAL

## 2024-05-24 NOTE — TELEPHONE ENCOUNTER
Pt had FUV with Dr. Alaniz but was r/s to Marissa Wylie per him.  When called pt said he wanted another order to draw PSA for Aug.  The orders from Dr. Nye he will do in June/July but wanted a fresh one for the FUV on 8/20.  Please call the pt when ordered or if not going to be ordered.  Please advise.

## 2024-05-30 DIAGNOSIS — C61 PROSTATE CANCER (MULTI): Primary | ICD-10-CM

## 2024-05-30 NOTE — TELEPHONE ENCOUNTER
Called patient, LM.  He can have his PSA lab drawn anytime after 8/6/24.    Left callback should he have questions [phone] opt. 2.    TYLER BENDER RN

## 2024-05-30 NOTE — TELEPHONE ENCOUNTER
"Spoke to patient, verified name/.    Relayed the message from Marissa Wylie,    \"His PSA has been undetectable.  He just had one in May.  If he is  getting it AGAIN in July, he will  not need one for August.  Insurance not likely to cover.  K    Patient stated he has too much anxiety waiting that long, as he has been having his PSA drawn monthly for the past seven months.  He is requesting to have an additional PSA order placed for early August and stated he is willing to pay out of pocket, should insurance not cover the cost.      Marissa, do you mind placing a PSA for August?  Please advise and nursing will relay the message.     Thank you,  TYLER BENDER, RN    "

## 2024-06-05 ENCOUNTER — APPOINTMENT (OUTPATIENT)
Dept: PRIMARY CARE | Facility: CLINIC | Age: 54
End: 2024-06-05
Payer: COMMERCIAL

## 2024-06-17 ENCOUNTER — APPOINTMENT (OUTPATIENT)
Dept: RADIATION ONCOLOGY | Facility: HOSPITAL | Age: 54
End: 2024-06-17
Payer: COMMERCIAL

## 2024-06-21 ENCOUNTER — OFFICE VISIT (OUTPATIENT)
Dept: UROLOGY | Facility: CLINIC | Age: 54
End: 2024-06-21
Payer: COMMERCIAL

## 2024-06-21 VITALS — DIASTOLIC BLOOD PRESSURE: 84 MMHG | SYSTOLIC BLOOD PRESSURE: 134 MMHG | TEMPERATURE: 96.8 F | HEART RATE: 94 BPM

## 2024-06-21 DIAGNOSIS — C61 PROSTATE CANCER (MULTI): Primary | ICD-10-CM

## 2024-06-21 NOTE — PROGRESS NOTES
PRIOR NOTES  53-year-old male referred to me for evaluation of an elevated PSA  PMH: Type 2 diabetes, BMI 39, chronic venous stasis, hypertension, hyperlipidemia, erectile dysfunction, BPH with LUTS, COPD , GOLDY on CPAP  PSHx: 08/2022 - Gastric sleeve - 110lb loss.     PSA history:  08/16/2023-5.9, 8% free  08/2020-3.84     MRI prostate 09/11/2023: 23.3 g gland, PSA density 0.25, PI-RADS 5 lesion right posterior lateral with irregularity of the prostate capsule concerning for extraprostatic extension  Exam - firm R unruly-gland cT2b     TP fusion Bx 10/10/23 - GG2 RP target and RP lateral up to 95% of tissue; GG1 in RPM and RA     CYNTHIA 21  IPSS 16  PROSTATE CA DX  - initial dx 10/10/23 - iPSA 5.9; GG2, cT2b on exam, cT3a by MRI  - 2/13/24 - spaceOARF + fiducials  - 3/1/24 - Received leoprolide acetate x 6mo (last  shot today)  - 3/15/24 - 4000 cGy 5 fractions  - 5/17/24 - PSA undetectable, T undetectable    UPDATED SUBJECTIVE HISTORY  06/21/24 - Pt reports no observable side effects. Has had a tiny amount of hematuria.     Past Medical History  He has a past medical history of Hyperlipidemia, Hypertension, Other conditions influencing health status, Personal history of other endocrine, nutritional and metabolic disease, Personal history of other endocrine, nutritional and metabolic disease, Personal history of other endocrine, nutritional and metabolic disease (11/21/2022), Personal history of other endocrine, nutritional and metabolic disease (12/07/2021), and Prostate cancer (Multi).    Surgical History  He has a past surgical history that includes Gastrectomy.     Social History  He reports that he has quit smoking. His smoking use included cigarettes. He has never used smokeless tobacco. He reports current alcohol use. He reports that he does not use drugs.    Family History  Family History   Problem Relation Name Age of Onset    Other (CVA Tenderness) Mother      Other (Cerebrovascular accidnet) Mother      Lung  cancer Father          Allergies  Patient has no known allergies.    ROS: 12 system review was completed and is negative with the exception of those signs and symptoms noted in the history of present illness: A 12 system review was completed and is negative with the exception of those signs and symptoms noted in the history of present illness.     Exam:  General: in NAD, appears stated age  Head: normocephalic, atraumatic  Respiratory: normal effort, no use of accessory muscles  Cardiovascular: no edema noted  Skin: normal turgor, no rashes  Neurologic: grossly intact, oriented to person/place/time  Psychiatric: mode and affect appropriate     Last Recorded Vitals  Blood pressure 134/84, pulse 94, temperature 36 °C (96.8 °F).    Lab Results   Component Value Date    PSASCREEN 6.85 (H) 05/12/2023    CREATININE 1.02 04/19/2024    HGB 11.9 (L) 05/17/2024         ASSESSMENT/PLAN:  54-year-old male with prostate cancer now s/p ADT plus SBRT and on trial  -Patient is continuing to follow with radiation oncology and oncology  -In light of this, and his lack of any urinary symptoms at this time, I advised him to call me if anything should arise requiring my attention    Enrike Mcgill MD

## 2024-07-01 ENCOUNTER — LAB (OUTPATIENT)
Dept: LAB | Facility: LAB | Age: 54
End: 2024-07-01
Payer: COMMERCIAL

## 2024-07-01 DIAGNOSIS — C61 PROSTATE CANCER (MULTI): ICD-10-CM

## 2024-07-01 LAB
PSA SERPL-MCNC: <0.01 NG/ML
TESTOST SERPL-MCNC: <30 NG/DL (ref 240–1000)

## 2024-07-01 PROCEDURE — 84153 ASSAY OF PSA TOTAL: CPT

## 2024-07-01 PROCEDURE — 84403 ASSAY OF TOTAL TESTOSTERONE: CPT

## 2024-07-15 ENCOUNTER — APPOINTMENT (OUTPATIENT)
Dept: CARDIOLOGY | Facility: CLINIC | Age: 54
End: 2024-07-15
Payer: COMMERCIAL

## 2024-07-15 ENCOUNTER — TELEPHONE (OUTPATIENT)
Dept: CARDIOLOGY | Facility: CLINIC | Age: 54
End: 2024-07-15

## 2024-07-15 VITALS
BODY MASS INDEX: 40.46 KG/M2 | HEART RATE: 88 BPM | SYSTOLIC BLOOD PRESSURE: 132 MMHG | DIASTOLIC BLOOD PRESSURE: 78 MMHG | HEIGHT: 71 IN | WEIGHT: 289 LBS

## 2024-07-15 DIAGNOSIS — R60.0 BILATERAL LEG EDEMA: ICD-10-CM

## 2024-07-15 DIAGNOSIS — I10 PRIMARY HYPERTENSION: ICD-10-CM

## 2024-07-15 DIAGNOSIS — E11.9 TYPE 2 DIABETES MELLITUS WITHOUT COMPLICATION, WITHOUT LONG-TERM CURRENT USE OF INSULIN (MULTI): ICD-10-CM

## 2024-07-15 DIAGNOSIS — E78.2 MIXED HYPERLIPIDEMIA: ICD-10-CM

## 2024-07-15 DIAGNOSIS — Z98.84 S/P GASTRIC BYPASS: ICD-10-CM

## 2024-07-15 DIAGNOSIS — Z76.89 ENCOUNTER TO ESTABLISH CARE WITH NEW DOCTOR: ICD-10-CM

## 2024-07-15 DIAGNOSIS — G47.33 OSA ON CPAP: ICD-10-CM

## 2024-07-15 DIAGNOSIS — I87.2 CHRONIC VENOUS STASIS DERMATITIS: ICD-10-CM

## 2024-07-15 DIAGNOSIS — J44.9 CHRONIC OBSTRUCTIVE PULMONARY DISEASE, UNSPECIFIED COPD TYPE (MULTI): ICD-10-CM

## 2024-07-15 DIAGNOSIS — Z87.891 FORMER SMOKER: ICD-10-CM

## 2024-07-15 NOTE — PROGRESS NOTES
Chief complaint:   Chief Complaint   Patient presents with    New Patient Visit     leg pain with swelling and redness        History of Present Illness  Iglesia Rojo is a 54 y.o. year old male patient with history of COPD, hyperlipidemia, hypertension, morbid obesity status post gastric sleeve, and prostate cancer who is presenting for evaluation of bilateral leg swelling.    Patient reports he has been with bilateral leg swelling for the last several years.  After having a gastric sleeve he lost about 100 pounds and had some improvement of leg swelling.  However he still has persistent bilateral leg swelling with skin changes and intermittent weeping edema.  Reports feeling heaviness in both legs but denies claudication, rest pain or foot wounds.  No history of DVT  Reports he wears compression stockings routinely which controls the swelling, however it is still persistent.        Social History     Tobacco Use    Smoking status: Former     Types: Cigarettes    Smokeless tobacco: Never   Substance Use Topics    Alcohol use: Yes     Comment: SOCIALLY    Drug use: Never       Outpatient Medications:  Current Outpatient Medications   Medication Instructions    atorvastatin (LIPITOR) 20 mg, oral, Daily    hydroCHLOROthiazide (MICROZIDE) 12.5 mg, oral, Daily    lisinopril 40 mg, oral, Daily         Vitals:  Vitals:    07/15/24 1106   BP: 132/78   Pulse: 88       Physical Exam:  General: NAD, well-appearing  HEENT: moist mucous membranes, no jaundice  Neck: No JVD, no carotid bruit  Lungs: CTA taylor, no wheezing or rales  Cardiac: RRR, no murmurs  Abdomen: soft, non-tender, non-distended  Extremities: 2+ radial pulses, 1+ bilateral edema, chronic skin changes, palpable pedal pulses  Skin: warm, dry, hyperpigmentation in bilateral shins, no open wounds  Neurologic: AAOx3,  no focal deficits       Reviewed Study(s):    No vascular studies noted    Lipid Panel 5/12/23:  TC- 141  HDL- 49  LDL-74  TG- 89        Assessment/Plan   Diagnoses and all orders for this visit:  Chronic venous stasis dermatitis  Primary hypertension  Mixed hyperlipidemia  Chronic obstructive pulmonary disease, unspecified COPD type (Multi)  GOLDY on CPAP  Type 2 diabetes mellitus without complication, without long-term current use of insulin (Multi)  S/P gastric bypass  Bilateral leg edema  BMI 40.0-44.9, adult (Multi)  Encounter to establish care with new doctor  Former smoker      # Bilateral leg swelling  # Venous insufficiency with leg edema  -Will obtain venous reflux ultrasound bilaterally  -Recommend to start wearing compression stockings 30 to 40 mmHg of pressure  -Continue regular walking, leg elevation when resting  -Follow-up after testing    Norma Torres MD [facility]  Interventional Cardiology  Endovascular Interventions  [email or web address]    **Disclaimer: This note was dictated by speech recognition, and every effort has been made to prevent any error in transcription, however minor errors may be present**

## 2024-07-15 NOTE — TELEPHONE ENCOUNTER
----- Message from Norma Salas sent at 7/15/2024  4:05 PM EDT -----  Regarding: stockings  Hey for Mr Rojo    Can we send him a prescription for compression stockings 30 to 40 mmHg pressure?.  I think we wrote 20-30, but I really wanted 30-40.  Thank you

## 2024-07-15 NOTE — PATIENT INSTRUCTIONS
Patient to follow up in 2 months with Dr. Norma Torres MD [facility]    Office will arrange Venous Reflux study in near future.   Please also  compression stockings, 20-30mmHG> Wear daily while up and moving around.     No other changes today.   Continue same medications and treatments.   Patient educated on proper medication use.   Patient educated on risk factor modification.   Please bring any lab results from other providers / physicians to your next appointment.     Please bring all medicines, vitamins, and herbal supplements with you when you come to the office.     Prescriptions will not be filled unless you are compliant with your follow up appointments or have a follow up appointment scheduled as per instruction of your physician. Refills should be requested at the time of your visit.    IYou RN am scribing for and in the presence of Dr. Norma Torres MD [facility]

## 2024-07-31 DIAGNOSIS — C61 PROSTATE CANCER (MULTI): ICD-10-CM

## 2024-08-01 ENCOUNTER — HOSPITAL ENCOUNTER (OUTPATIENT)
Dept: RADIOLOGY | Facility: HOSPITAL | Age: 54
Discharge: HOME | End: 2024-08-01
Payer: COMMERCIAL

## 2024-08-01 DIAGNOSIS — I87.2 CHRONIC VENOUS STASIS DERMATITIS: ICD-10-CM

## 2024-08-01 DIAGNOSIS — R60.0 BILATERAL LEG EDEMA: ICD-10-CM

## 2024-08-01 PROCEDURE — 93970 EXTREMITY STUDY: CPT

## 2024-08-14 ENCOUNTER — LAB (OUTPATIENT)
Dept: LAB | Facility: LAB | Age: 54
End: 2024-08-14
Payer: COMMERCIAL

## 2024-08-14 DIAGNOSIS — C61 PROSTATE CANCER (MULTI): ICD-10-CM

## 2024-08-14 LAB
ALBUMIN SERPL BCP-MCNC: 3.9 G/DL (ref 3.4–5)
ALP SERPL-CCNC: 80 U/L (ref 33–120)
ALT SERPL W P-5'-P-CCNC: 9 U/L (ref 10–52)
ANION GAP SERPL CALC-SCNC: 12 MMOL/L (ref 10–20)
AST SERPL W P-5'-P-CCNC: 12 U/L (ref 9–39)
BASOPHILS # BLD AUTO: 0.04 X10*3/UL (ref 0–0.1)
BASOPHILS NFR BLD AUTO: 0.7 %
BILIRUB SERPL-MCNC: 0.5 MG/DL (ref 0–1.2)
BUN SERPL-MCNC: 16 MG/DL (ref 6–23)
CALCIUM SERPL-MCNC: 9.1 MG/DL (ref 8.6–10.3)
CHLORIDE SERPL-SCNC: 102 MMOL/L (ref 98–107)
CO2 SERPL-SCNC: 28 MMOL/L (ref 21–32)
CREAT SERPL-MCNC: 0.84 MG/DL (ref 0.5–1.3)
EGFRCR SERPLBLD CKD-EPI 2021: >90 ML/MIN/1.73M*2
EOSINOPHIL # BLD AUTO: 0.21 X10*3/UL (ref 0–0.7)
EOSINOPHIL NFR BLD AUTO: 3.6 %
ERYTHROCYTE [DISTWIDTH] IN BLOOD BY AUTOMATED COUNT: 11.7 % (ref 11.5–14.5)
GLUCOSE SERPL-MCNC: 94 MG/DL (ref 74–99)
HCT VFR BLD AUTO: 38.3 % (ref 41–52)
HGB BLD-MCNC: 12.6 G/DL (ref 13.5–17.5)
IMM GRANULOCYTES # BLD AUTO: 0.01 X10*3/UL (ref 0–0.7)
IMM GRANULOCYTES NFR BLD AUTO: 0.2 % (ref 0–0.9)
LYMPHOCYTES # BLD AUTO: 1.08 X10*3/UL (ref 1.2–4.8)
LYMPHOCYTES NFR BLD AUTO: 18.4 %
MCH RBC QN AUTO: 31.2 PG (ref 26–34)
MCHC RBC AUTO-ENTMCNC: 32.9 G/DL (ref 32–36)
MCV RBC AUTO: 95 FL (ref 80–100)
MONOCYTES # BLD AUTO: 0.45 X10*3/UL (ref 0.1–1)
MONOCYTES NFR BLD AUTO: 7.7 %
NEUTROPHILS # BLD AUTO: 4.07 X10*3/UL (ref 1.2–7.7)
NEUTROPHILS NFR BLD AUTO: 69.4 %
NRBC BLD-RTO: 0 /100 WBCS (ref 0–0)
PLATELET # BLD AUTO: 212 X10*3/UL (ref 150–450)
POTASSIUM SERPL-SCNC: 4.4 MMOL/L (ref 3.5–5.3)
PROT SERPL-MCNC: 5.9 G/DL (ref 6.4–8.2)
PSA SERPL-MCNC: 0.01 NG/ML
RBC # BLD AUTO: 4.04 X10*6/UL (ref 4.5–5.9)
SODIUM SERPL-SCNC: 138 MMOL/L (ref 136–145)
TESTOST SERPL-MCNC: 164 NG/DL (ref 240–1000)
WBC # BLD AUTO: 5.9 X10*3/UL (ref 4.4–11.3)

## 2024-08-16 ENCOUNTER — APPOINTMENT (OUTPATIENT)
Dept: UROLOGY | Facility: CLINIC | Age: 54
End: 2024-08-16
Payer: COMMERCIAL

## 2024-08-19 ENCOUNTER — APPOINTMENT (OUTPATIENT)
Dept: RADIATION ONCOLOGY | Facility: CLINIC | Age: 54
End: 2024-08-19
Payer: COMMERCIAL

## 2024-08-20 ENCOUNTER — HOSPITAL ENCOUNTER (OUTPATIENT)
Dept: RADIATION ONCOLOGY | Facility: CLINIC | Age: 54
Setting detail: RADIATION/ONCOLOGY SERIES
Discharge: HOME | End: 2024-08-20
Payer: COMMERCIAL

## 2024-08-20 VITALS
WEIGHT: 286.82 LBS | SYSTOLIC BLOOD PRESSURE: 131 MMHG | HEART RATE: 86 BPM | RESPIRATION RATE: 18 BRPM | DIASTOLIC BLOOD PRESSURE: 82 MMHG | BODY MASS INDEX: 40 KG/M2 | OXYGEN SATURATION: 98 % | TEMPERATURE: 96.8 F

## 2024-08-20 DIAGNOSIS — N52.9 MALE ERECTILE DISORDER OF ORGANIC ORIGIN: ICD-10-CM

## 2024-08-20 DIAGNOSIS — C61 PROSTATE CANCER (MULTI): Primary | ICD-10-CM

## 2024-08-20 PROCEDURE — 99213 OFFICE O/P EST LOW 20 MIN: CPT | Performed by: NURSE PRACTITIONER

## 2024-08-20 ASSESSMENT — ENCOUNTER SYMPTOMS
OCCASIONAL FEELINGS OF UNSTEADINESS: 0
DEPRESSION: 0
LOSS OF SENSATION IN FEET: 0

## 2024-08-20 ASSESSMENT — PAIN SCALES - GENERAL: PAINLEVEL: 0-NO PAIN

## 2024-08-20 NOTE — PROGRESS NOTES
Radiation Oncology Follow-Up    Patient Name:  Iglesia Rojo  MRN:  [id number]  :  1970    Referring Provider: Enrike Mcgill MD  Primary Care Provider: Geri Verduzco MD  Care Team: Patient Care Team:  Geri Verduzco MD as PCP - General (Family Medicine)  Geri Verduzco MD as PCP - [facility] PCP  Juanita Nye MD as Consulting Physician (Hematology and Oncology)  Nino Mane MD PhD as Consulting Physician (Hematology and Oncology)  Norma Slaas MD as Cardiologist (Cardiology)    Date of Service: 2024   53 y.o. male who was referred by Enrike Mcgill MD, for a consultation to the [facility] Department of Radiation Oncology.      He initially presented to Urology with an elevated PSA.  He underwent a biopsy  which demonstrated [place] score 3+4=7, 9/17 positive cores. MRI of the prostate showed likely focal EDVIN, no SV involvement, and no pelvic lymph node adenopathy. PSMA PET/CT did not reveal evidence of distant mets but showed some uptake in the left illiac node, while the prostate disease was more on the right side of the prostate.    Oncologic History:      PSA history:  2023-5.9, 8% free  2020-3.84     MRI prostate 2023: 23.3 g gland, PSA density 0.25, PI-RADS 5 lesion right posterior lateral with irregularity of the prostate capsule concerning for extraprostatic extension  Exam - firm R unruly-gland cT2b     TP fusion Bx 10/10/23 - GG2 RP target and RP lateral up to 95% of tissue; GG1 in RPM and RA     CYNTHIA 21  IPSS 16  PROSTATE CA DX  - Initial dx 10/10/23 - iPSA 5.9; GG2, cT2b on exam, cT3a by MRI  - 24 - spaceOARF + fiducials  - 3/1/24 - Received leoprolide acetate x 6mo (last  shot today)  - 3/15/24 - 4000 cGy 5 fractions  - 24 - PSA undetectable, T undetectable    - 3/6/24 - 3/15/24: SBRT to prostate consisting of a dose of 40 Gy given in 5 fractions of 8 Gy per fraction.    SUBJECTIVE  History of Present Illness:    Iglesia Rojo is here today for routine radiation follow up. Doing well. Denies any urinary symptoms or hematuria. CYNTHIA score 0 with loss of libido.  Testosterone level rising but still low. PSA undetectable. Denies diarrhea, bloody stools or constipation. Energy level is baseline. Denies any headaches, fever, chills, cough, SOB, chest pain or bony pain.     Review of Systems:    Review of Systems   All other systems reviewed and are negative.    Performance Status:   The Karnofsky performance scale today is 100, Fully active, able to carry on all pre-disease performed without restriction (ECOG equivalent 0).      OBJECTIVE    Current Outpatient Medications:   •  atorvastatin (Lipitor) 20 mg tablet, Take 1 tablet (20 mg) by mouth once daily., Disp: 90 tablet, Rfl: 3  •  hydroCHLOROthiazide (HYDRODiuril) 12.5 mg tablet, Take 1 tablet (12.5 mg) by mouth once daily., Disp: 90 tablet, Rfl: 3  •  lisinopril 40 mg tablet, Take 1 tablet (40 mg) by mouth once daily., Disp: 90 tablet, Rfl: 3     Physical Exam  Constitutional:       Appearance: Normal appearance.   HENT:      Head: Normocephalic and atraumatic.      Nose: Nose normal.      Mouth/Throat:      Mouth: Mucous membranes are moist.      Pharynx: Oropharynx is clear.   Eyes:      Conjunctiva/sclera: Conjunctivae normal.      Pupils: Pupils are equal, round, and reactive to light.   Cardiovascular:      Rate and Rhythm: Normal rate and regular rhythm.      Heart sounds: Normal heart sounds.   Pulmonary:      Effort: Pulmonary effort is normal.      Breath sounds: Normal breath sounds.   Abdominal:      Palpations: Abdomen is soft.   Musculoskeletal:         General: No swelling. Normal range of motion.      Cervical back: Normal range of motion and neck supple.   Lymphadenopathy:      Cervical: No cervical adenopathy.   Skin:     General: Skin is warm.   Neurological:      General: No focal deficit present.      Mental Status: He is alert and oriented to person,  place, and time.   Psychiatric:         Mood and Affect: Mood normal.         Behavior: Behavior normal.       RESULTS:  Prostate Specific AG   Date/Time Value Ref Range Status   08/14/2024 07:17 AM 0.01 <=4.00 ng/mL Final                   Component  Ref Range & Units 6 d ago  (8/14/24) 1 mo ago  (7/1/24) 3 mo ago  (5/17/24) 4 mo ago  (4/19/24) 5 mo ago  (3/22/24) 5 mo ago  (2/23/24) 6 mo ago  (1/26/24)   Testosterone  240 - 1,000 ng/dL 164 Low  <30 Low  CM <30 Low  CM <30 Low  CM <30 Low  CM <30 Low  CM <30 Low  CM        ASSESSMENT:  54 y.o. male with intermediate risk prostate cancer s/p SBRT. Doing well.     PLAN:    - Follow up with Dr. Nye in November with PSA and testosterone.  - Radiation follow up per clinical trials in 6 mo.  Call with any questions or concerns.     Marissa Wylie CNP  [phone]

## 2024-08-23 ENCOUNTER — APPOINTMENT (OUTPATIENT)
Dept: RADIATION ONCOLOGY | Facility: CLINIC | Age: 54
End: 2024-08-23
Payer: COMMERCIAL

## 2024-10-07 ENCOUNTER — APPOINTMENT (OUTPATIENT)
Dept: CARDIOLOGY | Facility: CLINIC | Age: 54
End: 2024-10-07
Payer: COMMERCIAL

## 2024-10-07 VITALS
BODY MASS INDEX: 39.87 KG/M2 | SYSTOLIC BLOOD PRESSURE: 122 MMHG | DIASTOLIC BLOOD PRESSURE: 74 MMHG | HEART RATE: 92 BPM | WEIGHT: 285.9 LBS

## 2024-10-07 DIAGNOSIS — E78.2 MIXED HYPERLIPIDEMIA: ICD-10-CM

## 2024-10-07 DIAGNOSIS — Z87.891 FORMER SMOKER: ICD-10-CM

## 2024-10-07 DIAGNOSIS — R60.0 BILATERAL LEG EDEMA: ICD-10-CM

## 2024-10-07 DIAGNOSIS — I10 PRIMARY HYPERTENSION: ICD-10-CM

## 2024-10-07 DIAGNOSIS — I87.2 CHRONIC VENOUS INSUFFICIENCY: ICD-10-CM

## 2024-10-07 PROCEDURE — 4010F ACE/ARB THERAPY RXD/TAKEN: CPT | Performed by: STUDENT IN AN ORGANIZED HEALTH CARE EDUCATION/TRAINING PROGRAM

## 2024-10-07 PROCEDURE — 99213 OFFICE O/P EST LOW 20 MIN: CPT | Performed by: STUDENT IN AN ORGANIZED HEALTH CARE EDUCATION/TRAINING PROGRAM

## 2024-10-07 PROCEDURE — 1036F TOBACCO NON-USER: CPT | Performed by: STUDENT IN AN ORGANIZED HEALTH CARE EDUCATION/TRAINING PROGRAM

## 2024-10-07 PROCEDURE — 3074F SYST BP LT 130 MM HG: CPT | Performed by: STUDENT IN AN ORGANIZED HEALTH CARE EDUCATION/TRAINING PROGRAM

## 2024-10-07 PROCEDURE — 3078F DIAST BP <80 MM HG: CPT | Performed by: STUDENT IN AN ORGANIZED HEALTH CARE EDUCATION/TRAINING PROGRAM

## 2024-10-07 NOTE — PROGRESS NOTES
Chief complaint:   Chief Complaint   Patient presents with    Follow-up     3 month follow-up to discuss testing results        History of Present Illness  Iglesia Rojo is a 54 y.o. year old male patient with history of COPD, hyperlipidemia, hypertension, morbid obesity status post gastric sleeve, and prostate cancer who is presenting for follow up of bilateral leg swelling, L>R.      Patient reports he has been with bilateral leg swelling for the last several years, L>R.  After having a gastric sleeve he lost about 100 pounds and had some improvement of leg swelling.  However he still has persistent left leg swelling with skin changes and intermittent weeping edema.  Reports feeling heaviness in both legs but denies claudication, rest pain or foot wounds.  No history of DVT  Reports he wears compression stockings routinely which controls the swelling, however it is still persistent.  Discussed lymphedema massage therapy with patient- patient will call in future if he wants to proceed.       Social History     Tobacco Use    Smoking status: Former     Types: Cigarettes     Passive exposure: Past    Smokeless tobacco: Never   Substance Use Topics    Alcohol use: Yes     Comment: SOCIALLY    Drug use: Never       Outpatient Medications:  Current Outpatient Medications   Medication Instructions    atorvastatin (LIPITOR) 20 mg, oral, Daily    hydroCHLOROthiazide (MICROZIDE) 12.5 mg, oral, Daily    lisinopril 40 mg, oral, Daily         Vitals:  Vitals:    10/07/24 0913   BP: 122/74   Pulse: 92       Physical Exam:  General: NAD, well-appearing  HEENT: moist mucous membranes, no jaundice  Neck: No JVD, no carotid bruit  Lungs: CTA taylor, no wheezing or rales  Cardiac: RRR, no murmurs  Abdomen: soft, non-tender, non-distended  Extremities: 2+ radial pulses, mild bilateral leg edema, palpable pulses, LDS changes in left leg  Skin: warm, dry, no wound  Neurologic: AAOx3,  no focal deficits       Reviewed  Study(s):    Venous Insufficiency Bilateral 8/1/2024:  -Right Lower Venous Insufficiency: Right leg demonstrates no evidence of deep vein thrombosis or deep or superficial venous insufficiency. No identified reflux in the great or small saphenous vein.  -Left Lower Venous Insufficiency: Left leg demonstrates no evidence of deep vein thrombosis or deep or superficial venous insufficiency. No identified reflux in the great or small saphenous vein.  -Right Lower Venous: No evidence of acute deep vein thrombus visualized in the right lower extremity.  -Left Lower Venous: No evidence of acute deep vein thrombus visualized in the left lower extremity.    Lipid Panel 5/12/23:  TC- 141  HDL- 49  LDL-74  TG- 89       Assessment/Plan   Diagnoses and all orders for this visit:  Bilateral leg edema  Chronic venous insufficiency  Primary hypertension  Mixed hyperlipidemia  BMI 39.0-39.9,adult  Former smoker        # Bilateral leg swelling  # Venous insufficiency with leg edema  -Reports improvement in leg edema when wearing compression stockings 30 to 40 mmHg of pressure  -Venous reflux reviewed with no evidence of axial or deep vein reflux. Prior CT abdomen from 2023 reviewed showing no significant pelvic vein compression or occlusion  -Continue regular walking, leg elevation when resting  -Will call in future if he chooses to proceed with lymphedema therapy    RTC PRN      Norma Torres MD [facility]  Interventional Cardiology  Endovascular Interventions  [email or web address]    **Disclaimer: This note was dictated by speech recognition, and every effort has been made to prevent any error in transcription, however minor errors may be present**

## 2024-10-07 NOTE — PATIENT INSTRUCTIONS
Patient to follow up as needed in future Dr. Norma Torres MD [facility]     Encouraged to continue wearing compression stockings.     Eucerin, Aquaphor creams are good.   Can use the steroid cream if the above does not improve.     No other changes today.   Continue same medications and treatments.   Patient educated on proper medication use.   Patient educated on risk factor modification.   Please bring any lab results from other providers / physicians to your next appointment.     Please bring all medicines, vitamins, and herbal supplements with you when you come to the office.     Prescriptions will not be filled unless you are compliant with your follow up appointments or have a follow up appointment scheduled as per instruction of your physician. Refills should be requested at the time of your visit.    IYou RN am scribing for and in the presence of Dr. Norma Torres MD [facility]

## 2024-10-14 ENCOUNTER — TELEPHONE (OUTPATIENT)
Dept: PRIMARY CARE | Facility: CLINIC | Age: 54
End: 2024-10-14
Payer: COMMERCIAL

## 2024-10-14 NOTE — TELEPHONE ENCOUNTER
Can you see if pt will scheduled a physical. He is overdue. If he doesn't want to, let me know and ill see if she will ok for me to order just the A1C since he hasn't had one in a while.   Asthma

## 2024-10-15 DIAGNOSIS — E11.9 TYPE 2 DIABETES MELLITUS WITHOUT COMPLICATION, WITHOUT LONG-TERM CURRENT USE OF INSULIN (MULTI): ICD-10-CM

## 2024-11-15 ENCOUNTER — LAB (OUTPATIENT)
Dept: LAB | Facility: LAB | Age: 54
End: 2024-11-15
Payer: COMMERCIAL

## 2024-11-15 DIAGNOSIS — C61 PROSTATE CANCER (MULTI): ICD-10-CM

## 2024-11-15 LAB
ALBUMIN SERPL BCP-MCNC: 3.7 G/DL (ref 3.4–5)
ALP SERPL-CCNC: 76 U/L (ref 33–120)
ALT SERPL W P-5'-P-CCNC: 9 U/L (ref 10–52)
ANION GAP SERPL CALC-SCNC: 10 MMOL/L (ref 10–20)
AST SERPL W P-5'-P-CCNC: 11 U/L (ref 9–39)
BASOPHILS # BLD AUTO: 0.04 X10*3/UL (ref 0–0.1)
BASOPHILS NFR BLD AUTO: 0.7 %
BILIRUB SERPL-MCNC: 0.7 MG/DL (ref 0–1.2)
BUN SERPL-MCNC: 15 MG/DL (ref 6–23)
CALCIUM SERPL-MCNC: 8.8 MG/DL (ref 8.6–10.3)
CHLORIDE SERPL-SCNC: 104 MMOL/L (ref 98–107)
CO2 SERPL-SCNC: 29 MMOL/L (ref 21–32)
CREAT SERPL-MCNC: 0.77 MG/DL (ref 0.5–1.3)
EGFRCR SERPLBLD CKD-EPI 2021: >90 ML/MIN/1.73M*2
EOSINOPHIL # BLD AUTO: 0.21 X10*3/UL (ref 0–0.7)
EOSINOPHIL NFR BLD AUTO: 3.6 %
ERYTHROCYTE [DISTWIDTH] IN BLOOD BY AUTOMATED COUNT: 13.2 % (ref 11.5–14.5)
GLUCOSE SERPL-MCNC: 102 MG/DL (ref 74–99)
HCT VFR BLD AUTO: 38.7 % (ref 41–52)
HGB BLD-MCNC: 12.6 G/DL (ref 13.5–17.5)
IMM GRANULOCYTES # BLD AUTO: 0.02 X10*3/UL (ref 0–0.7)
IMM GRANULOCYTES NFR BLD AUTO: 0.3 % (ref 0–0.9)
LYMPHOCYTES # BLD AUTO: 1.1 X10*3/UL (ref 1.2–4.8)
LYMPHOCYTES NFR BLD AUTO: 18.6 %
MCH RBC QN AUTO: 29.1 PG (ref 26–34)
MCHC RBC AUTO-ENTMCNC: 32.6 G/DL (ref 32–36)
MCV RBC AUTO: 89 FL (ref 80–100)
MONOCYTES # BLD AUTO: 0.45 X10*3/UL (ref 0.1–1)
MONOCYTES NFR BLD AUTO: 7.6 %
NEUTROPHILS # BLD AUTO: 4.08 X10*3/UL (ref 1.2–7.7)
NEUTROPHILS NFR BLD AUTO: 69.2 %
NRBC BLD-RTO: 0 /100 WBCS (ref 0–0)
PLATELET # BLD AUTO: 211 X10*3/UL (ref 150–450)
POTASSIUM SERPL-SCNC: 4.5 MMOL/L (ref 3.5–5.3)
PROT SERPL-MCNC: 5.9 G/DL (ref 6.4–8.2)
PSA SERPL-MCNC: 0.08 NG/ML
RBC # BLD AUTO: 4.33 X10*6/UL (ref 4.5–5.9)
SODIUM SERPL-SCNC: 138 MMOL/L (ref 136–145)
TESTOST SERPL-MCNC: 522 NG/DL (ref 240–1000)
WBC # BLD AUTO: 5.9 X10*3/UL (ref 4.4–11.3)

## 2024-11-18 ENCOUNTER — OFFICE VISIT (OUTPATIENT)
Dept: HEMATOLOGY/ONCOLOGY | Facility: CLINIC | Age: 54
End: 2024-11-18
Payer: COMMERCIAL

## 2024-11-18 ENCOUNTER — DOCUMENTATION (OUTPATIENT)
Dept: HEMATOLOGY/ONCOLOGY | Facility: CLINIC | Age: 54
End: 2024-11-18

## 2024-11-18 VITALS
HEART RATE: 99 BPM | RESPIRATION RATE: 16 BRPM | DIASTOLIC BLOOD PRESSURE: 78 MMHG | WEIGHT: 299.7 LBS | BODY MASS INDEX: 41.8 KG/M2 | TEMPERATURE: 97.5 F | SYSTOLIC BLOOD PRESSURE: 132 MMHG | OXYGEN SATURATION: 98 %

## 2024-11-18 DIAGNOSIS — C61 PROSTATE CANCER (MULTI): Primary | ICD-10-CM

## 2024-11-18 DIAGNOSIS — N32.89 BLADDER SPASM: ICD-10-CM

## 2024-11-18 PROBLEM — M54.50 LOW BACK PAIN: Status: RESOLVED | Noted: 2023-05-16 | Resolved: 2024-11-18

## 2024-11-18 PROBLEM — R60.0 BILATERAL LEG EDEMA: Status: RESOLVED | Noted: 2024-07-15 | Resolved: 2024-11-18

## 2024-11-18 PROCEDURE — 99215 OFFICE O/P EST HI 40 MIN: CPT | Performed by: INTERNAL MEDICINE

## 2024-11-18 ASSESSMENT — ENCOUNTER SYMPTOMS
APPETITE CHANGE: 0
VOMITING: 0
CHILLS: 0
PALPITATIONS: 0
DIAPHORESIS: 0
DIFFICULTY URINATING: 0
DIARRHEA: 0
COUGH: 0
HEMATURIA: 0
ADENOPATHY: 0
FEVER: 0
DIZZINESS: 0
FLANK PAIN: 0
NERVOUS/ANXIOUS: 0
BRUISES/BLEEDS EASILY: 0
CHEST TIGHTNESS: 0
ABDOMINAL PAIN: 0
SHORTNESS OF BREATH: 0
UNEXPECTED WEIGHT CHANGE: 0
CONSTIPATION: 0
ABDOMINAL DISTENTION: 0
FATIGUE: 0
LEG SWELLING: 0
FREQUENCY: 0
LIGHT-HEADEDNESS: 0
HEADACHES: 0
HOT FLASHES: 0
BACK PAIN: 0
NUMBNESS: 0
NAUSEA: 0
DYSURIA: 0
SEIZURES: 0

## 2024-11-18 ASSESSMENT — PAIN SCALES - GENERAL: PAINLEVEL_OUTOF10: 0-NO PAIN

## 2024-11-18 NOTE — PROGRESS NOTES
FOLLOW UP: GENITOURINARY CANCER CLINIC    Diagnosis: High risk prostate cancer (cT3a/ [place] 3+4/ iPSA 6.85)   Oncologic history (see initial consultation note for more detail)    Current therapy: [facility] 1821 (C1 start 11/30/2023)    Interval history: Iglesia Rojo is a 54 y.o. year old male who presents today to clinic for 6 month fu on [facility] 1821. He feels well. He notes occasional bladder spasm first thing in the morning- no leakage, pain, incontinence.     ROS: Review of Systems   Constitutional:  Negative for appetite change, chills, diaphoresis, fatigue, fever and unexpected weight change.   HENT:   Negative for lump/mass.    Respiratory:  Negative for chest tightness, cough and shortness of breath.    Cardiovascular:  Negative for chest pain, leg swelling and palpitations.   Gastrointestinal:  Negative for abdominal distention, abdominal pain, constipation, diarrhea, nausea and vomiting.   Endocrine: Negative for hot flashes.   Genitourinary:  Negative for bladder incontinence, difficulty urinating, dyspareunia, dysuria, frequency, hematuria and nocturia.    Musculoskeletal:  Negative for back pain, flank pain and gait problem.   Skin:  Negative for rash.   Neurological:  Negative for dizziness, gait problem, headaches, light-headedness, numbness and seizures.   Hematological:  Negative for adenopathy. Does not bruise/bleed easily.   Psychiatric/Behavioral:  The patient is not nervous/anxious.      PE:  Physical Exam  Vitals reviewed.   Constitutional:       Appearance: Normal appearance.   HENT:      Head: Normocephalic and atraumatic.      Nose: Nose normal.      Mouth/Throat:      Mouth: Mucous membranes are moist.      Pharynx: Oropharynx is clear.   Eyes:      Extraocular Movements: Extraocular movements intact.      Conjunctiva/sclera: Conjunctivae normal.   Cardiovascular:      Rate and Rhythm: Normal rate and regular rhythm.   Pulmonary:      Effort: Pulmonary effort is normal.       Breath sounds: Normal breath sounds.   Abdominal:      General: Abdomen is flat. Bowel sounds are normal.      Palpations: Abdomen is soft.   Musculoskeletal:         General: Normal range of motion.      Cervical back: Normal range of motion.   Skin:     General: Skin is warm and dry.   Neurological:      General: No focal deficit present.      Mental Status: He is alert and oriented to person, place, and time. Mental status is at baseline.   Psychiatric:         Mood and Affect: Mood normal.         Behavior: Behavior normal.         Thought Content: Thought content normal.         Judgment: Judgment normal.       ECOG 0    Labs:     11/15/24 07:39   GLUCOSE 102 (H)   SODIUM 138   POTASSIUM 4.5   CHLORIDE 104   Bicarbonate 29   Anion Gap 10   Blood Urea Nitrogen 15   Creatinine 0.77   EGFR >90   Calcium 8.8   Albumin 3.7   Alkaline Phosphatase 76   ALT 9 (L)   AST 11   Bilirubin Total 0.7   Total Protein 5.9 (L)   Testosterone 522   PSA 0.08   WBC 5.9   nRBC 0.0   RBC 4.33 (L)   HEMOGLOBIN 12.6 (L)   HEMATOCRIT 38.7 (L)   MCV 89   MCH 29.1   MCHC 32.6   RED CELL DISTRIBUTION WIDTH 13.2   Platelets 211   Neutrophils % 69.2   Immature Granulocytes %, Automated 0.3   Lymphocytes % 18.6   Monocytes % 7.6   Eosinophils % 3.6   Basophils % 0.7   Neutrophils Absolute 4.08   Immature Granulocytes Absolute, Automated 0.02   Lymphocytes Absolute 1.10 (L)   Monocytes Absolute 0.45   Eosinophils Absolute 0.21   Basophils Absolute 0.04     Assessment: High risk prostate cancer (cT3a/ [place] 3+4/ iPSA 6.85)     Plan: Iglesia Rojo is a 54 y.o. year old male who presents today to clinic for end of treatment visit for [facility] 1821. He feels well. We discussed emptying his bladder prior to sleep. Since the spasms don't continue through the day, he does not want any medical intervention. Will continue to monitor.     Testosterone rising nicely, PSA has also increased as expected. All within safe range. Will continue to monitor.      RTC per protocol    Iglesia Rojo understands the plan and has no further questions.     Juanita Nye MD  Attending Physician  [facility]      [employer] [employer]

## 2024-11-18 NOTE — PROGRESS NOTES
Research Note Follow Up Visit       Iglesia Rojo is here today for 6 month follow up on [facility]21.  Follow up procedures completed per protocol. Patient is aware of continued follow up plan.      Education Documentation  No documentation found.  Education Comments  No comments found.     Patient feeling well, no complaints.  Patient aware to return in 3 months for 9 month follow up visit with Dr. Nye & lab work.

## 2025-01-16 ENCOUNTER — DOCUMENTATION (OUTPATIENT)
Dept: HEMATOLOGY/ONCOLOGY | Facility: CLINIC | Age: 55
End: 2025-01-16
Payer: COMMERCIAL

## 2025-02-07 ENCOUNTER — LAB (OUTPATIENT)
Dept: LAB | Facility: CLINIC | Age: 55
End: 2025-02-07
Payer: COMMERCIAL

## 2025-02-07 DIAGNOSIS — C61 PROSTATE CANCER (MULTI): ICD-10-CM

## 2025-02-07 LAB
ALBUMIN SERPL BCP-MCNC: 4 G/DL (ref 3.4–5)
ALP SERPL-CCNC: 78 U/L (ref 33–120)
ALT SERPL W P-5'-P-CCNC: 10 U/L (ref 10–52)
ANION GAP SERPL CALC-SCNC: 11 MMOL/L (ref 10–20)
AST SERPL W P-5'-P-CCNC: 12 U/L (ref 9–39)
BASOPHILS # BLD AUTO: 0.01 X10*3/UL (ref 0–0.1)
BASOPHILS NFR BLD AUTO: 0.2 %
BILIRUB SERPL-MCNC: 0.8 MG/DL (ref 0–1.2)
BUN SERPL-MCNC: 18 MG/DL (ref 6–23)
CALCIUM SERPL-MCNC: 9.3 MG/DL (ref 8.6–10.3)
CHLORIDE SERPL-SCNC: 102 MMOL/L (ref 98–107)
CO2 SERPL-SCNC: 30 MMOL/L (ref 21–32)
CREAT SERPL-MCNC: 0.82 MG/DL (ref 0.5–1.3)
EGFRCR SERPLBLD CKD-EPI 2021: >90 ML/MIN/1.73M*2
EOSINOPHIL # BLD AUTO: 0.24 X10*3/UL (ref 0–0.7)
EOSINOPHIL NFR BLD AUTO: 4 %
ERYTHROCYTE [DISTWIDTH] IN BLOOD BY AUTOMATED COUNT: 13.3 % (ref 11.5–14.5)
GLUCOSE SERPL-MCNC: 108 MG/DL (ref 74–99)
HCT VFR BLD AUTO: 43.4 % (ref 41–52)
HGB BLD-MCNC: 14.1 G/DL (ref 13.5–17.5)
IMM GRANULOCYTES # BLD AUTO: 0.01 X10*3/UL (ref 0–0.7)
IMM GRANULOCYTES NFR BLD AUTO: 0.2 % (ref 0–0.9)
LYMPHOCYTES # BLD AUTO: 1.19 X10*3/UL (ref 1.2–4.8)
LYMPHOCYTES NFR BLD AUTO: 20 %
MCH RBC QN AUTO: 29 PG (ref 26–34)
MCHC RBC AUTO-ENTMCNC: 32.5 G/DL (ref 32–36)
MCV RBC AUTO: 89 FL (ref 80–100)
MONOCYTES # BLD AUTO: 0.42 X10*3/UL (ref 0.1–1)
MONOCYTES NFR BLD AUTO: 7.1 %
NEUTROPHILS # BLD AUTO: 4.08 X10*3/UL (ref 1.2–7.7)
NEUTROPHILS NFR BLD AUTO: 68.5 %
NRBC BLD-RTO: ABNORMAL /100{WBCS}
PLATELET # BLD AUTO: 196 X10*3/UL (ref 150–450)
POTASSIUM SERPL-SCNC: 3.9 MMOL/L (ref 3.5–5.3)
PROT SERPL-MCNC: 6.4 G/DL (ref 6.4–8.2)
PSA SERPL-MCNC: <0.1 NG/ML
RBC # BLD AUTO: 4.86 X10*6/UL (ref 4.5–5.9)
SODIUM SERPL-SCNC: 139 MMOL/L (ref 136–145)
TESTOST SERPL-MCNC: 523 NG/DL (ref 240–1000)
WBC # BLD AUTO: 6 X10*3/UL (ref 4.4–11.3)

## 2025-02-07 PROCEDURE — 36415 COLL VENOUS BLD VENIPUNCTURE: CPT

## 2025-02-07 PROCEDURE — 84403 ASSAY OF TOTAL TESTOSTERONE: CPT

## 2025-02-07 PROCEDURE — 84153 ASSAY OF PSA TOTAL: CPT

## 2025-02-07 PROCEDURE — 85025 COMPLETE CBC W/AUTO DIFF WBC: CPT

## 2025-02-07 PROCEDURE — 80053 COMPREHEN METABOLIC PANEL: CPT

## 2025-02-10 ENCOUNTER — OFFICE VISIT (OUTPATIENT)
Dept: HEMATOLOGY/ONCOLOGY | Facility: CLINIC | Age: 55
End: 2025-02-10
Payer: COMMERCIAL

## 2025-02-10 ENCOUNTER — DOCUMENTATION (OUTPATIENT)
Dept: HEMATOLOGY/ONCOLOGY | Facility: CLINIC | Age: 55
End: 2025-02-10

## 2025-02-10 VITALS
BODY MASS INDEX: 41.98 KG/M2 | DIASTOLIC BLOOD PRESSURE: 81 MMHG | OXYGEN SATURATION: 94 % | WEIGHT: 301 LBS | SYSTOLIC BLOOD PRESSURE: 153 MMHG | RESPIRATION RATE: 18 BRPM | TEMPERATURE: 97.9 F | HEART RATE: 96 BPM

## 2025-02-10 DIAGNOSIS — C61 PROSTATE CANCER (MULTI): Primary | ICD-10-CM

## 2025-02-10 PROCEDURE — 99215 OFFICE O/P EST HI 40 MIN: CPT | Performed by: INTERNAL MEDICINE

## 2025-02-10 ASSESSMENT — ENCOUNTER SYMPTOMS
COUGH: 0
ABDOMINAL PAIN: 0
FLANK PAIN: 0
VOMITING: 0
FREQUENCY: 0
SHORTNESS OF BREATH: 0
CHEST TIGHTNESS: 0
LEG SWELLING: 0
DYSURIA: 0
SEIZURES: 0
DIARRHEA: 0
HEADACHES: 0
NERVOUS/ANXIOUS: 0
BACK PAIN: 0
DIFFICULTY URINATING: 0
NAUSEA: 0
DIAPHORESIS: 0
FEVER: 0
HOT FLASHES: 0
UNEXPECTED WEIGHT CHANGE: 0
CHILLS: 0
FATIGUE: 0
BRUISES/BLEEDS EASILY: 0
PALPITATIONS: 0
DIZZINESS: 0
APPETITE CHANGE: 0
ADENOPATHY: 0
CONSTIPATION: 0
LIGHT-HEADEDNESS: 0
ABDOMINAL DISTENTION: 0
HEMATURIA: 0
NUMBNESS: 0

## 2025-02-10 ASSESSMENT — PAIN SCALES - GENERAL: PAINLEVEL_OUTOF10: 0-NO PAIN

## 2025-02-10 NOTE — PROGRESS NOTES
FOLLOW UP: GENITOURINARY CANCER CLINIC    Diagnosis: High risk prostate cancer (cT3a/ [place] 3+4/ iPSA 6.85)   Oncologic history (see initial consultation note for more detail)    Current therapy: [facility] 1821 (C1 start 11/30/2023)    Interval history: Iglesia Rojo is a 54 y.o. year old male who presents today to clinic for fu on [facility] 1821. He feels well. Occasionally has some bladder spasm; he does not know if they are less frequent or if he is just not mindful of them. No other symptoms to report.     ROS: Review of Systems   Constitutional:  Negative for appetite change, chills, diaphoresis, fatigue, fever and unexpected weight change.   HENT:   Negative for lump/mass.    Respiratory:  Negative for chest tightness, cough and shortness of breath.    Cardiovascular:  Negative for chest pain, leg swelling and palpitations.   Gastrointestinal:  Negative for abdominal distention, abdominal pain, constipation, diarrhea, nausea and vomiting.   Endocrine: Negative for hot flashes.   Genitourinary:  Negative for bladder incontinence, difficulty urinating, dyspareunia, dysuria, frequency, hematuria and nocturia.    Musculoskeletal:  Negative for back pain, flank pain and gait problem.   Skin:  Negative for rash.   Neurological:  Negative for dizziness, gait problem, headaches, light-headedness, numbness and seizures.   Hematological:  Negative for adenopathy. Does not bruise/bleed easily.   Psychiatric/Behavioral:  The patient is not nervous/anxious.      PE:  Physical Exam  Vitals reviewed.   Constitutional:       Appearance: Normal appearance.   HENT:      Head: Normocephalic and atraumatic.      Nose: Nose normal.      Mouth/Throat:      Mouth: Mucous membranes are moist.      Pharynx: Oropharynx is clear.   Eyes:      Extraocular Movements: Extraocular movements intact.      Conjunctiva/sclera: Conjunctivae normal.   Cardiovascular:      Rate and Rhythm: Normal rate and regular rhythm.   Pulmonary:       Effort: Pulmonary effort is normal.      Breath sounds: Normal breath sounds.   Abdominal:      General: Abdomen is flat. Bowel sounds are normal.      Palpations: Abdomen is soft.   Musculoskeletal:         General: Normal range of motion.      Cervical back: Normal range of motion.   Skin:     General: Skin is warm and dry.   Neurological:      General: No focal deficit present.      Mental Status: He is alert and oriented to person, place, and time. Mental status is at baseline.   Psychiatric:         Mood and Affect: Mood normal.         Behavior: Behavior normal.         Thought Content: Thought content normal.         Judgment: Judgment normal.       ECOG 0    Labs:   02/07/25 08:03   GLUCOSE 108 (H)   SODIUM 139   POTASSIUM 3.9   CHLORIDE 102   Bicarbonate 30   Anion Gap 11   Blood Urea Nitrogen 18   Creatinine 0.82   EGFR >90   Calcium 9.3   Albumin 4.0   Alkaline Phosphatase 78   ALT 10   AST 12   Bilirubin Total 0.8   Total Protein 6.4   Testosterone 523   PSA <0.10   WBC 6.0   nRBC COMMENT ONLY   RBC 4.86   HEMOGLOBIN 14.1   HEMATOCRIT 43.4   MCV 89   MCH 29.0   MCHC 32.5   RED CELL DISTRIBUTION WIDTH 13.3   Platelets 196   Neutrophils % 68.5   Immature Granulocytes %, Automated 0.2   Lymphocytes % 20.0   Monocytes % 7.1   Eosinophils % 4.0   Basophils % 0.2   Neutrophils Absolute 4.08   Immature Granulocytes Absolute, Automated 0.01   Lymphocytes Absolute 1.19 (L)   Monocytes Absolute 0.42   Eosinophils Absolute 0.24   Basophils Absolute 0.01     Assessment: High risk prostate cancer (cT3a/ [place] 3+4/ iPSA 6.85)     Plan: Iglesia Rojo is a 54 y.o. year old male who presents today to clinic for end of treatment visit for his 9 month post treatment visit on [facility] 1821. Clinically well. He will let us know if he the bladder spasms become irksome and wants intervention. Labs unremarkable. PSA not detectable.     RTC per protocol    Iglesia Rojo understands the plan and has no further questions.      Juanita Nye MD  Attending Physician  [facility]      [facility] [employer] [facility]

## 2025-02-10 NOTE — PROGRESS NOTES
Research Note Follow Up Visit       Iglesia Rojo is here today for 9 month follow up on [facility].  Follow up procedures completed per protocol. Patient is aware of continued follow up plan.      Education Documentation  No documentation found.  Education Comments  No comments found.     Patient reports feeling well, reports occasional gr. 1 bladder spasms.  To return in 3 months for 12 month follow up visit.

## 2025-05-09 ENCOUNTER — LAB (OUTPATIENT)
Dept: LAB | Facility: CLINIC | Age: 55
End: 2025-05-09
Payer: COMMERCIAL

## 2025-05-09 DIAGNOSIS — C61 PROSTATE CANCER (MULTI): ICD-10-CM

## 2025-05-09 LAB
ALBUMIN SERPL BCP-MCNC: 4 G/DL (ref 3.4–5)
ALP SERPL-CCNC: 83 U/L (ref 33–120)
ALT SERPL W P-5'-P-CCNC: 11 U/L (ref 10–52)
ANION GAP SERPL CALC-SCNC: 8 MMOL/L (ref 10–20)
AST SERPL W P-5'-P-CCNC: 12 U/L (ref 9–39)
BASOPHILS # BLD AUTO: 0.03 X10*3/UL (ref 0–0.1)
BASOPHILS NFR BLD AUTO: 0.5 %
BILIRUB SERPL-MCNC: 0.7 MG/DL (ref 0–1.2)
BUN SERPL-MCNC: 17 MG/DL (ref 6–23)
CALCIUM SERPL-MCNC: 9.3 MG/DL (ref 8.6–10.3)
CHLORIDE SERPL-SCNC: 102 MMOL/L (ref 98–107)
CO2 SERPL-SCNC: 31 MMOL/L (ref 21–32)
CREAT SERPL-MCNC: 0.85 MG/DL (ref 0.5–1.3)
EGFRCR SERPLBLD CKD-EPI 2021: >90 ML/MIN/1.73M*2
EOSINOPHIL # BLD AUTO: 0.22 X10*3/UL (ref 0–0.7)
EOSINOPHIL NFR BLD AUTO: 3.3 %
ERYTHROCYTE [DISTWIDTH] IN BLOOD BY AUTOMATED COUNT: 12.9 % (ref 11.5–14.5)
GLUCOSE SERPL-MCNC: 107 MG/DL (ref 74–99)
HCT VFR BLD AUTO: 42.1 % (ref 41–52)
HGB BLD-MCNC: 13.8 G/DL (ref 13.5–17.5)
IMM GRANULOCYTES # BLD AUTO: 0.01 X10*3/UL (ref 0–0.7)
IMM GRANULOCYTES NFR BLD AUTO: 0.2 % (ref 0–0.9)
LYMPHOCYTES # BLD AUTO: 1.11 X10*3/UL (ref 1.2–4.8)
LYMPHOCYTES NFR BLD AUTO: 16.9 %
MCH RBC QN AUTO: 29.5 PG (ref 26–34)
MCHC RBC AUTO-ENTMCNC: 32.8 G/DL (ref 32–36)
MCV RBC AUTO: 90 FL (ref 80–100)
MONOCYTES # BLD AUTO: 0.42 X10*3/UL (ref 0.1–1)
MONOCYTES NFR BLD AUTO: 6.4 %
NEUTROPHILS # BLD AUTO: 4.78 X10*3/UL (ref 1.2–7.7)
NEUTROPHILS NFR BLD AUTO: 72.7 %
NRBC BLD-RTO: ABNORMAL /100{WBCS}
PLATELET # BLD AUTO: 222 X10*3/UL (ref 150–450)
POTASSIUM SERPL-SCNC: 4.3 MMOL/L (ref 3.5–5.3)
PROT SERPL-MCNC: 6.7 G/DL (ref 6.4–8.2)
RBC # BLD AUTO: 4.68 X10*6/UL (ref 4.5–5.9)
SODIUM SERPL-SCNC: 137 MMOL/L (ref 136–145)
WBC # BLD AUTO: 6.6 X10*3/UL (ref 4.4–11.3)

## 2025-05-09 PROCEDURE — 80053 COMPREHEN METABOLIC PANEL: CPT

## 2025-05-09 PROCEDURE — 84403 ASSAY OF TOTAL TESTOSTERONE: CPT

## 2025-05-09 PROCEDURE — 85025 COMPLETE CBC W/AUTO DIFF WBC: CPT

## 2025-05-09 PROCEDURE — 84153 ASSAY OF PSA TOTAL: CPT

## 2025-05-09 PROCEDURE — 36415 COLL VENOUS BLD VENIPUNCTURE: CPT

## 2025-05-10 LAB
PSA SERPL-MCNC: 0.12 NG/ML
TESTOST SERPL-MCNC: 540 NG/DL (ref 240–1000)

## 2025-05-12 ENCOUNTER — DOCUMENTATION (OUTPATIENT)
Dept: HEMATOLOGY/ONCOLOGY | Facility: CLINIC | Age: 55
End: 2025-05-12

## 2025-05-12 ENCOUNTER — OFFICE VISIT (OUTPATIENT)
Dept: HEMATOLOGY/ONCOLOGY | Facility: CLINIC | Age: 55
End: 2025-05-12
Payer: COMMERCIAL

## 2025-05-12 VITALS
WEIGHT: 299 LBS | TEMPERATURE: 97.3 F | SYSTOLIC BLOOD PRESSURE: 115 MMHG | BODY MASS INDEX: 41.7 KG/M2 | DIASTOLIC BLOOD PRESSURE: 60 MMHG | HEART RATE: 89 BPM | RESPIRATION RATE: 18 BRPM | OXYGEN SATURATION: 97 %

## 2025-05-12 DIAGNOSIS — C61 PROSTATE CANCER (MULTI): Primary | ICD-10-CM

## 2025-05-12 PROCEDURE — 3074F SYST BP LT 130 MM HG: CPT | Performed by: INTERNAL MEDICINE

## 2025-05-12 PROCEDURE — 4010F ACE/ARB THERAPY RXD/TAKEN: CPT | Performed by: INTERNAL MEDICINE

## 2025-05-12 PROCEDURE — 99215 OFFICE O/P EST HI 40 MIN: CPT | Performed by: INTERNAL MEDICINE

## 2025-05-12 PROCEDURE — 3078F DIAST BP <80 MM HG: CPT | Performed by: INTERNAL MEDICINE

## 2025-05-12 PROCEDURE — 1036F TOBACCO NON-USER: CPT | Performed by: INTERNAL MEDICINE

## 2025-05-12 ASSESSMENT — ENCOUNTER SYMPTOMS
FLANK PAIN: 0
COUGH: 0
BACK PAIN: 0
LEG SWELLING: 0
VOMITING: 0
PALPITATIONS: 0
DIAPHORESIS: 0
NERVOUS/ANXIOUS: 0
CHILLS: 0
DIZZINESS: 0
ABDOMINAL PAIN: 0
CHEST TIGHTNESS: 0
FATIGUE: 0
DIFFICULTY URINATING: 0
NAUSEA: 0
FREQUENCY: 0
ABDOMINAL DISTENTION: 0
BRUISES/BLEEDS EASILY: 0
LIGHT-HEADEDNESS: 0
HEADACHES: 0
DYSURIA: 0
CONSTIPATION: 0
SHORTNESS OF BREATH: 0
APPETITE CHANGE: 0
DIARRHEA: 0
FEVER: 0
NUMBNESS: 0
UNEXPECTED WEIGHT CHANGE: 0
SEIZURES: 0
ADENOPATHY: 0
HOT FLASHES: 0
HEMATURIA: 0

## 2025-05-12 ASSESSMENT — PAIN SCALES - GENERAL: PAINLEVEL_OUTOF10: 0-NO PAIN

## 2025-05-12 NOTE — PROGRESS NOTES
FOLLOW UP: [facility]    Diagnosis: High risk prostate cancer (cT3a/ Layne 3+4/ iPSA 6.85)   Oncologic history (see initial consultation note for more detail)    Current therapy: BRUCE 1821 (C1 start 11/30/2023)    Interval history: Iglesia Rojo is a 55 y.o. year old male who presents today to clinic for fu on BRUCE 1821. He feels well. No symptoms to report.     ROS: Review of Systems   Constitutional:  Negative for appetite change, chills, diaphoresis, fatigue, fever and unexpected weight change.   HENT:   Negative for lump/mass.    Respiratory:  Negative for chest tightness, cough and shortness of breath.    Cardiovascular:  Negative for chest pain, leg swelling and palpitations.   Gastrointestinal:  Negative for abdominal distention, abdominal pain, constipation, diarrhea, nausea and vomiting.   Endocrine: Negative for hot flashes.   Genitourinary:  Negative for bladder incontinence, difficulty urinating, dyspareunia, dysuria, frequency, hematuria and nocturia.    Musculoskeletal:  Negative for back pain, flank pain and gait problem.   Skin:  Negative for rash.   Neurological:  Negative for dizziness, gait problem, headaches, light-headedness, numbness and seizures.   Hematological:  Negative for adenopathy. Does not bruise/bleed easily.   Psychiatric/Behavioral:  The patient is not nervous/anxious.      PE:  Physical Exam  Vitals reviewed.   Constitutional:       Appearance: Normal appearance.   HENT:      Head: Normocephalic and atraumatic.      Nose: Nose normal.      Mouth/Throat:      Mouth: Mucous membranes are moist.      Pharynx: Oropharynx is clear.   Eyes:      Extraocular Movements: Extraocular movements intact.      Conjunctiva/sclera: Conjunctivae normal.   Cardiovascular:      Rate and Rhythm: Normal rate and regular rhythm.   Pulmonary:      Effort: Pulmonary effort is normal.      Breath sounds: Normal breath sounds.   Abdominal:      General: Abdomen is flat.  Bowel sounds are normal.      Palpations: Abdomen is soft.   Musculoskeletal:         General: Normal range of motion.      Cervical back: Normal range of motion.   Skin:     General: Skin is warm and dry.   Neurological:      General: No focal deficit present.      Mental Status: He is alert and oriented to person, place, and time. Mental status is at baseline.   Psychiatric:         Mood and Affect: Mood normal.         Behavior: Behavior normal.         Thought Content: Thought content normal.         Judgment: Judgment normal.       ECOG 0    Labs:  Lab Results   Component Value Date    PSA 0.12 05/09/2025    PSA <0.10 02/07/2025    PSA 0.08 11/15/2024     Lab Results   Component Value Date    WBC 6.6 05/09/2025    HGB 13.8 05/09/2025    HCT 42.1 05/09/2025    MCV 90 05/09/2025     05/09/2025     Lab Results   Component Value Date    GLUCOSE 107 (H) 05/09/2025    CALCIUM 9.3 05/09/2025     05/09/2025    K 4.3 05/09/2025    CO2 31 05/09/2025     05/09/2025    BUN 17 05/09/2025    CREATININE 0.85 05/09/2025     Lab Results   Component Value Date    TESTOSTERONE 540 05/09/2025     Lab Results   Component Value Date    ALT 11 05/09/2025    AST 12 05/09/2025    ALKPHOS 83 05/09/2025    BILITOT 0.7 05/09/2025     Assessment: High risk prostate cancer (cT3a/ [place]quincy 3+4/ iPSA 6.85)     Plan: Iglesia Rojo is a 55 y.o. year old male who presents today to clinic for fu. He is doing well. No symptoms to report. PSA is detectable but within range of his previous. We will repeat a PSA/testosterone next visit. We discussed that in an irradiated prostate, a PSA <2 is still considered within an expected range, especially when he is no longer castrate as there is still viable normal prostate cells present. There is no role for imaging at this time.    RTC 3 mo to trend PSA.     Iglesia Rojo understands the plan and has no further questions.     Juanita Nye MD  Attending Physician  [facility]      [facility] [employer] [facility] [employer]

## 2025-05-16 ENCOUNTER — APPOINTMENT (OUTPATIENT)
Dept: PRIMARY CARE | Facility: CLINIC | Age: 55
End: 2025-05-16
Payer: COMMERCIAL

## 2025-05-16 VITALS
DIASTOLIC BLOOD PRESSURE: 68 MMHG | SYSTOLIC BLOOD PRESSURE: 116 MMHG | HEIGHT: 71 IN | BODY MASS INDEX: 41.72 KG/M2 | HEART RATE: 100 BPM | TEMPERATURE: 98.2 F | RESPIRATION RATE: 20 BRPM | WEIGHT: 298 LBS

## 2025-05-16 DIAGNOSIS — Z12.11 COLON CANCER SCREENING: Primary | ICD-10-CM

## 2025-05-16 DIAGNOSIS — E66.01 MORBID OBESITY WITH BMI OF 40.0-44.9, ADULT (MULTI): ICD-10-CM

## 2025-05-16 DIAGNOSIS — F41.9 ANXIETY: ICD-10-CM

## 2025-05-16 DIAGNOSIS — E55.9 VITAMIN D DEFICIENCY: ICD-10-CM

## 2025-05-16 DIAGNOSIS — E11.9 TYPE 2 DIABETES MELLITUS WITHOUT COMPLICATION, WITHOUT LONG-TERM CURRENT USE OF INSULIN: ICD-10-CM

## 2025-05-16 DIAGNOSIS — Z00.00 ENCOUNTER FOR ANNUAL PHYSICAL EXAM: Primary | ICD-10-CM

## 2025-05-16 DIAGNOSIS — J44.9 CHRONIC OBSTRUCTIVE PULMONARY DISEASE, UNSPECIFIED COPD TYPE (MULTI): ICD-10-CM

## 2025-05-16 DIAGNOSIS — C61 PROSTATE CANCER (MULTI): ICD-10-CM

## 2025-05-16 DIAGNOSIS — Z12.11 COLON CANCER SCREENING: ICD-10-CM

## 2025-05-16 DIAGNOSIS — C77.5 SECONDARY AND UNSPECIFIED MALIGNANT NEOPLASM OF INTRAPELVIC LYMPH NODES (MULTI): ICD-10-CM

## 2025-05-16 DIAGNOSIS — E78.2 MIXED HYPERLIPIDEMIA: ICD-10-CM

## 2025-05-16 DIAGNOSIS — I10 PRIMARY HYPERTENSION: ICD-10-CM

## 2025-05-16 DIAGNOSIS — R73.09 ELEVATED HEMOGLOBIN A1C: ICD-10-CM

## 2025-05-16 DIAGNOSIS — Z23 IMMUNIZATION DUE: ICD-10-CM

## 2025-05-16 PROBLEM — I27.0 PRIMARY PULMONARY HYPERTENSION (MULTI): Status: ACTIVE | Noted: 2025-05-16

## 2025-05-16 PROBLEM — I27.0 PRIMARY PULMONARY HYPERTENSION (MULTI): Status: RESOLVED | Noted: 2025-05-16 | Resolved: 2025-05-16

## 2025-05-16 PROCEDURE — 90715 TDAP VACCINE 7 YRS/> IM: CPT | Performed by: FAMILY MEDICINE

## 2025-05-16 PROCEDURE — 96127 BRIEF EMOTIONAL/BEHAV ASSMT: CPT | Performed by: FAMILY MEDICINE

## 2025-05-16 PROCEDURE — 99396 PREV VISIT EST AGE 40-64: CPT | Performed by: FAMILY MEDICINE

## 2025-05-16 PROCEDURE — 90471 IMMUNIZATION ADMIN: CPT | Performed by: FAMILY MEDICINE

## 2025-05-16 RX ORDER — PROPRANOLOL HYDROCHLORIDE 10 MG/1
10 TABLET ORAL 3 TIMES DAILY
COMMUNITY
End: 2025-05-16 | Stop reason: SDUPTHER

## 2025-05-16 RX ORDER — ATORVASTATIN CALCIUM 20 MG/1
20 TABLET, FILM COATED ORAL DAILY
Qty: 90 TABLET | Refills: 3 | Status: SHIPPED | OUTPATIENT
Start: 2025-05-16

## 2025-05-16 RX ORDER — HYDROCHLOROTHIAZIDE 12.5 MG/1
12.5 TABLET ORAL DAILY
Qty: 90 TABLET | Refills: 3 | Status: SHIPPED | OUTPATIENT
Start: 2025-05-16

## 2025-05-16 RX ORDER — PROPRANOLOL HYDROCHLORIDE 10 MG/1
10 TABLET ORAL 3 TIMES DAILY
Qty: 90 TABLET | Refills: 3 | Status: SHIPPED | OUTPATIENT
Start: 2025-05-16

## 2025-05-16 RX ORDER — LISINOPRIL 40 MG/1
40 TABLET ORAL DAILY
Qty: 90 TABLET | Refills: 3 | Status: SHIPPED | OUTPATIENT
Start: 2025-05-16

## 2025-05-16 ASSESSMENT — ENCOUNTER SYMPTOMS
SEIZURES: 0
NAUSEA: 0
FATIGUE: 0
BLOOD IN STOOL: 0
BRUISES/BLEEDS EASILY: 0
DIARRHEA: 0
BACK PAIN: 0
COUGH: 0
CONSTIPATION: 0
DYSPHORIC MOOD: 0
SORE THROAT: 0
ARTHRALGIAS: 0
WHEEZING: 0
POLYDIPSIA: 0
DIFFICULTY URINATING: 0
NERVOUS/ANXIOUS: 0
HEMATURIA: 0
SHORTNESS OF BREATH: 0
VOMITING: 0
RHINORRHEA: 0
FEVER: 0
PALPITATIONS: 0

## 2025-05-16 ASSESSMENT — PATIENT HEALTH QUESTIONNAIRE - PHQ9
1. LITTLE INTEREST OR PLEASURE IN DOING THINGS: NOT AT ALL
SUM OF ALL RESPONSES TO PHQ9 QUESTIONS 1 AND 2: 0
2. FEELING DOWN, DEPRESSED OR HOPELESS: NOT AT ALL

## 2025-05-19 RX ORDER — POLYETHYLENE GLYCOL 3350, SODIUM CHLORIDE, SODIUM BICARBONATE, POTASSIUM CHLORIDE 420; 11.2; 5.72; 1.48 G/4L; G/4L; G/4L; G/4L
4000 POWDER, FOR SOLUTION ORAL ONCE
Qty: 4000 ML | Refills: 0 | Status: SHIPPED | OUTPATIENT
Start: 2025-05-19 | End: 2025-05-19

## 2025-05-20 ENCOUNTER — ANESTHESIA EVENT (OUTPATIENT)
Dept: GASTROENTEROLOGY | Facility: EXTERNAL LOCATION | Age: 55
End: 2025-05-20

## 2025-06-02 ENCOUNTER — APPOINTMENT (OUTPATIENT)
Dept: GASTROENTEROLOGY | Facility: EXTERNAL LOCATION | Age: 55
End: 2025-06-02
Payer: COMMERCIAL

## 2025-06-02 ENCOUNTER — ANESTHESIA (OUTPATIENT)
Dept: GASTROENTEROLOGY | Facility: EXTERNAL LOCATION | Age: 55
End: 2025-06-02

## 2025-06-02 VITALS
WEIGHT: 298 LBS | DIASTOLIC BLOOD PRESSURE: 61 MMHG | TEMPERATURE: 98.2 F | SYSTOLIC BLOOD PRESSURE: 116 MMHG | BODY MASS INDEX: 41.72 KG/M2 | HEART RATE: 81 BPM | HEIGHT: 71 IN | RESPIRATION RATE: 14 BRPM | OXYGEN SATURATION: 98 %

## 2025-06-02 DIAGNOSIS — Z12.11 COLON CANCER SCREENING: ICD-10-CM

## 2025-06-02 RX ORDER — PROPOFOL 10 MG/ML
INJECTION, EMULSION INTRAVENOUS AS NEEDED
Status: DISCONTINUED | OUTPATIENT
Start: 2025-06-02 | End: 2025-06-02

## 2025-06-02 RX ORDER — ONDANSETRON HYDROCHLORIDE 2 MG/ML
4 INJECTION, SOLUTION INTRAVENOUS ONCE AS NEEDED
Status: DISCONTINUED | OUTPATIENT
Start: 2025-06-02 | End: 2025-06-03 | Stop reason: HOSPADM

## 2025-06-02 RX ADMIN — PROPOFOL 50 MG: 10 INJECTION, EMULSION INTRAVENOUS at 13:04

## 2025-06-02 RX ADMIN — PROPOFOL 120 MG: 10 INJECTION, EMULSION INTRAVENOUS at 12:57

## 2025-06-02 RX ADMIN — PROPOFOL 50 MG: 10 INJECTION, EMULSION INTRAVENOUS at 13:08

## 2025-06-02 RX ADMIN — PROPOFOL 50 MG: 10 INJECTION, EMULSION INTRAVENOUS at 13:10

## 2025-06-02 RX ADMIN — PROPOFOL 50 MG: 10 INJECTION, EMULSION INTRAVENOUS at 13:02

## 2025-06-02 RX ADMIN — PROPOFOL 80 MG: 10 INJECTION, EMULSION INTRAVENOUS at 12:59

## 2025-06-02 RX ADMIN — PROPOFOL 50 MG: 10 INJECTION, EMULSION INTRAVENOUS at 13:13

## 2025-06-02 RX ADMIN — PROPOFOL 50 MG: 10 INJECTION, EMULSION INTRAVENOUS at 13:06

## 2025-06-02 SDOH — HEALTH STABILITY: MENTAL HEALTH: CURRENT SMOKER: 0

## 2025-06-02 ASSESSMENT — PAIN SCALES - GENERAL
PAINLEVEL_OUTOF10: 0 - NO PAIN
PAINLEVEL_OUTOF10: 0 - NO PAIN
PAIN_LEVEL: 0
PAINLEVEL_OUTOF10: 0 - NO PAIN
PAINLEVEL_OUTOF10: 0 - NO PAIN

## 2025-06-02 ASSESSMENT — PAIN - FUNCTIONAL ASSESSMENT
PAIN_FUNCTIONAL_ASSESSMENT: 0-10

## 2025-06-02 NOTE — ANESTHESIA PREPROCEDURE EVALUATION
Patient: Iglesia Rojo    Procedure Information       Date/Time: 06/02/25 1300    Scheduled providers: Darwin Ware MD    Procedure: COLONOSCOPY    Location: [facility]            Relevant Problems   Cardiac   (+) Hyperlipemia   (+) Hypertension      Pulmonary   (+) Chronic obstructive pulmonary disease (Multi)      Neuro   (+) Anxiety      GI   (+) Gastroesophageal reflux disease without esophagitis      /Renal   (+) BPH with obstruction/lower urinary tract symptoms   (+) Prostate cancer (Multi)       Clinical information reviewed:   Tobacco  Allergies  Meds   Med Hx  Surg Hx   Fam Hx  Soc Hx        NPO Detail:  NPO/Void Status  Date of Last Liquid: 06/02/25  Time of Last Liquid: 0600  Date of Last Solid: 05/31/25  Time of Last Solid: 1800         Physical Exam    Airway  Mallampati: I  TM distance: >3 FB  Neck ROM: full     Cardiovascular   Rhythm: regular  Rate: normal     Dental    Pulmonary Breath sounds clear to auscultation     Abdominal (+) obese  Abdomen: soft  Bowel sounds: normal           Anesthesia Plan    History of general anesthesia?: yes  History of complications of general anesthesia?: no    ASA 3     MAC     The patient is not a current smoker.  Patient was not previously instructed to abstain from smoking on day of procedure.  Education provided regarding risk of obstructive sleep apnea.  intravenous induction   Anesthetic plan and risks discussed with patient.    Plan discussed with CRNA.

## 2025-06-02 NOTE — DISCHARGE INSTRUCTIONS

## 2025-06-02 NOTE — H&P
Outpatient Hospital Procedure H&P    Patient Profile-Procedures  Initial Info  Patient Demographics  Name Iglesia Rojo  Date of Birth 1970  MRN [id number]  Address   [address]   [facility] [id number]   [facility] [address]    Primary Phone Number [phone]  Secondary Phone Number    Maame Daugherty    Procedure(s):  Colonoscopy  Primary contact name and number   Extended Emergency Contact Information  Primary Emergency Contact: Lillie Rojo  Home Phone: [phone]  Relation: Parent    General Health  Weight   Vitals:    06/02/25 1227   Weight: 135 kg (298 lb)     BMI Body mass index is 41.56 kg/m².    Allergies  RX Allergies[1]    Past Medical History   Medical History[2]    Provider assessment  Diagnosis: H/o polyps    Medication Reviewed - yes  Prior to Admission medications    Medication Sig Start Date End Date Taking? Authorizing Provider   atorvastatin (Lipitor) 20 mg tablet Take 1 tablet (20 mg) by mouth once daily. 5/16/25  Yes Geri Verduzco MD   hydroCHLOROthiazide (Microzide) 12.5 mg tablet Take 1 tablet (12.5 mg) by mouth once daily. 5/16/25  Yes Geri Verduzco MD   lisinopril 40 mg tablet Take 1 tablet (40 mg) by mouth once daily. 5/16/25  Yes Geri Verduzco MD   propranolol (Inderal) 10 mg tablet Take 1 tablet (10 mg) by mouth 3 times a day. 5/16/25   Geri Verduzco MD       Physical Exam  Vitals:    06/02/25 1227   BP: 150/83   Pulse: 91   Resp: 18   Temp: 36.6 °C (97.9 °F)   SpO2: 94%        General: A&Ox3, NAD.  CV: RRR. No murmur.  Resp: CTA bilaterally. No wheezing, rhonchi or rales.   Extrem: No edema.       Oropharyngeal Classification I (soft palate, uvula, fauces, and tonsillar pillars visible)  ASA PS Classification 3  Sedation Plan Deep  Procedure Plan - pre-procedural (re)assesment completed by physician:  discharge/transfer patient when discharge criteria met    Darwin Ware MD  6/2/2025 12:55 PM           [1] No Known Allergies  [2]    Past Medical History:  Diagnosis Date    Hyperlipidemia     Hypertension     Other conditions influencing health status     Diabetes mellitus with neurological manifestations, uncontrolled    Prostate cancer (Multi)

## 2025-06-02 NOTE — ANESTHESIA POSTPROCEDURE EVALUATION
Patient: Iglesia Rojo    Procedure Summary       Date: 06/02/25 Room / Location: [facility]    Anesthesia Start: 1253 Anesthesia Stop: 1324    Procedure: COLONOSCOPY Diagnosis: History of colon polyps    Scheduled Providers: Darwin Ware MD Responsible Provider: MAGNOLIA Correa    Anesthesia Type: MAC ASA Status: 3            Anesthesia Type: MAC    Vitals Value Taken Time   /74 06/02/25 13:22   Temp 36.8 °C (98.2 °F) 06/02/25 13:22   Pulse 85 06/02/25 13:22   Resp 17 06/02/25 13:22   SpO2 97 % 06/02/25 13:22       Anesthesia Post Evaluation    Patient location during evaluation: PACU  Patient participation: complete - patient participated  Level of consciousness: sleepy but conscious  Pain score: 0  Pain management: adequate  Airway patency: patent  Cardiovascular status: acceptable  Respiratory status: acceptable  Hydration status: acceptable  Postoperative Nausea and Vomiting: none        No notable events documented.

## 2025-06-13 LAB
LABORATORY COMMENT REPORT: NORMAL
PATH REPORT.FINAL DX SPEC: NORMAL
PATH REPORT.GROSS SPEC: NORMAL
PATH REPORT.TOTAL CANCER: NORMAL

## 2025-06-18 ENCOUNTER — APPOINTMENT (OUTPATIENT)
Dept: UROLOGY | Facility: CLINIC | Age: 55
End: 2025-06-18
Payer: COMMERCIAL

## 2025-06-18 VITALS
HEART RATE: 83 BPM | SYSTOLIC BLOOD PRESSURE: 152 MMHG | WEIGHT: 302.7 LBS | BODY MASS INDEX: 42.38 KG/M2 | DIASTOLIC BLOOD PRESSURE: 78 MMHG | HEIGHT: 71 IN

## 2025-06-18 DIAGNOSIS — N23 RENAL COLIC ON RIGHT SIDE: ICD-10-CM

## 2025-06-18 DIAGNOSIS — C61 PROSTATE CANCER (MULTI): Primary | ICD-10-CM

## 2025-06-18 DIAGNOSIS — R31.29 MICROSCOPIC HEMATURIA: ICD-10-CM

## 2025-06-18 DIAGNOSIS — R31.0 GROSS HEMATURIA: ICD-10-CM

## 2025-06-18 LAB
POC APPEARANCE, URINE: ABNORMAL
POC BILIRUBIN, URINE: NEGATIVE
POC BLOOD, URINE: ABNORMAL
POC COLOR, URINE: YELLOW
POC GLUCOSE, URINE: NEGATIVE MG/DL
POC KETONES, URINE: ABNORMAL MG/DL
POC LEUKOCYTES, URINE: NEGATIVE
POC NITRITE,URINE: NEGATIVE
POC PH, URINE: 5.5 PH
POC PROTEIN, URINE: ABNORMAL MG/DL
POC SPECIFIC GRAVITY, URINE: 1.02
POC UROBILINOGEN, URINE: 0.2 EU/DL

## 2025-06-18 RX ORDER — POLYETHYLENE GLYCOL-3350, SODIUM CHLORIDE, POTASSIUM CHLORIDE AND SODIUM BICARBONATE 420; 11.2; 5.72; 1.48 G/438.4G; G/438.4G; G/438.4G; G/438.4G
POWDER, FOR SOLUTION ORAL
COMMUNITY
Start: 2025-05-19

## 2025-06-22 LAB
25(OH)D3+25(OH)D2 SERPL-MCNC: 31 NG/ML (ref 30–100)
ANION GAP SERPL CALCULATED.4IONS-SCNC: 10 MMOL/L (CALC) (ref 7–17)
BUN SERPL-MCNC: 20 MG/DL (ref 7–25)
BUN/CREAT SERPL: ABNORMAL (CALC) (ref 6–22)
CALCIUM SERPL-MCNC: 9.2 MG/DL (ref 8.6–10.3)
CHLORIDE SERPL-SCNC: 103 MMOL/L (ref 98–110)
CHOLEST SERPL-MCNC: 120 MG/DL
CHOLEST/HDLC SERPL: 2.3 (CALC)
CO2 SERPL-SCNC: 28 MMOL/L (ref 20–32)
CREAT SERPL-MCNC: 0.84 MG/DL (ref 0.7–1.3)
EGFRCR SERPLBLD CKD-EPI 2021: 102 ML/MIN/1.73M2
EST. AVERAGE GLUCOSE BLD GHB EST-MCNC: 131 MG/DL
EST. AVERAGE GLUCOSE BLD GHB EST-SCNC: 7.3 MMOL/L
GLUCOSE SERPL-MCNC: 107 MG/DL (ref 65–99)
HBA1C MFR BLD: 6.2 %
HDLC SERPL-MCNC: 53 MG/DL
LDLC SERPL CALC-MCNC: 53 MG/DL (CALC)
NONHDLC SERPL-MCNC: 67 MG/DL (CALC)
POTASSIUM SERPL-SCNC: 4.7 MMOL/L (ref 3.5–5.3)
SODIUM SERPL-SCNC: 141 MMOL/L (ref 135–146)
TRIGL SERPL-MCNC: 62 MG/DL
TSH SERPL-ACNC: 1.05 MIU/L (ref 0.4–4.5)

## 2025-06-30 ENCOUNTER — HOSPITAL ENCOUNTER (OUTPATIENT)
Dept: RADIOLOGY | Facility: CLINIC | Age: 55
Discharge: HOME | End: 2025-06-30
Payer: COMMERCIAL

## 2025-06-30 DIAGNOSIS — R31.0 GROSS HEMATURIA: ICD-10-CM

## 2025-06-30 DIAGNOSIS — R31.29 MICROSCOPIC HEMATURIA: ICD-10-CM

## 2025-06-30 DIAGNOSIS — N23 RENAL COLIC ON RIGHT SIDE: ICD-10-CM

## 2025-06-30 DIAGNOSIS — C61 PROSTATE CANCER (MULTI): ICD-10-CM

## 2025-06-30 PROCEDURE — 2550000001 HC RX 255 CONTRASTS

## 2025-06-30 PROCEDURE — 76377 3D RENDER W/INTRP POSTPROCES: CPT

## 2025-06-30 RX ADMIN — IOHEXOL 75 ML: 350 INJECTION, SOLUTION INTRAVENOUS at 09:12

## 2025-08-08 ENCOUNTER — LAB (OUTPATIENT)
Dept: LAB | Facility: CLINIC | Age: 55
End: 2025-08-08
Payer: COMMERCIAL

## 2025-08-08 DIAGNOSIS — C61 PROSTATE CANCER (MULTI): ICD-10-CM

## 2025-08-08 LAB
PSA SERPL-MCNC: <0.1 NG/ML
TESTOST SERPL-MCNC: 490 NG/DL (ref 240–1000)

## 2025-08-08 PROCEDURE — 84403 ASSAY OF TOTAL TESTOSTERONE: CPT

## 2025-08-08 PROCEDURE — 36415 COLL VENOUS BLD VENIPUNCTURE: CPT

## 2025-08-08 PROCEDURE — 84153 ASSAY OF PSA TOTAL: CPT

## 2025-08-08 PROCEDURE — 84154 ASSAY OF PSA FREE: CPT

## 2025-08-10 LAB
PSA FREE MFR SERPL: NORMAL %
PSA FREE SERPL-MCNC: <0.1 NG/ML
PSA SERPL IA-MCNC: <0.1 NG/ML (ref 0–4)

## 2025-08-11 ENCOUNTER — OFFICE VISIT (OUTPATIENT)
Dept: HEMATOLOGY/ONCOLOGY | Facility: CLINIC | Age: 55
End: 2025-08-11
Payer: COMMERCIAL

## 2025-08-11 VITALS
WEIGHT: 303.9 LBS | HEART RATE: 97 BPM | OXYGEN SATURATION: 98 % | DIASTOLIC BLOOD PRESSURE: 78 MMHG | RESPIRATION RATE: 18 BRPM | BODY MASS INDEX: 42.39 KG/M2 | TEMPERATURE: 97.9 F | SYSTOLIC BLOOD PRESSURE: 125 MMHG

## 2025-08-11 DIAGNOSIS — N52.9 ERECTILE DYSFUNCTION, UNSPECIFIED ERECTILE DYSFUNCTION TYPE: Primary | ICD-10-CM

## 2025-08-11 DIAGNOSIS — C61 PROSTATE CANCER (MULTI): ICD-10-CM

## 2025-08-11 PROCEDURE — 99214 OFFICE O/P EST MOD 30 MIN: CPT | Performed by: NURSE PRACTITIONER

## 2025-08-11 RX ORDER — TADALAFIL 10 MG/1
10 TABLET ORAL DAILY PRN
Qty: 10 TABLET | Refills: 1 | Status: SHIPPED | OUTPATIENT
Start: 2025-08-11 | End: 2025-09-10

## 2025-08-11 ASSESSMENT — PAIN SCALES - GENERAL: PAINLEVEL_OUTOF10: 0-NO PAIN

## 2025-08-18 ENCOUNTER — APPOINTMENT (OUTPATIENT)
Dept: UROLOGY | Facility: CLINIC | Age: 55
End: 2025-08-18
Payer: COMMERCIAL

## 2025-09-23 ENCOUNTER — APPOINTMENT (OUTPATIENT)
Dept: UROLOGY | Facility: CLINIC | Age: 55
End: 2025-09-23
Payer: COMMERCIAL

## 2025-10-21 ENCOUNTER — APPOINTMENT (OUTPATIENT)
Dept: PRIMARY CARE | Facility: CLINIC | Age: 55
End: 2025-10-21
Payer: COMMERCIAL

## 2026-05-19 ENCOUNTER — APPOINTMENT (OUTPATIENT)
Dept: PRIMARY CARE | Facility: CLINIC | Age: 56
End: 2026-05-19
Payer: COMMERCIAL

## (undated) DEVICE — SPONGE, GAUZE, RADIOPAQUE, 12 PLY, 4 X 8 IN, STERILE, LF

## (undated) DEVICE — GLOVE, SURGICAL, PROTEXIS MICRO, 8.5, PF, LATEX

## (undated) DEVICE — SPONGE, GAUZE, XRAY DECT, 16 PLY, 4 X 4, W/MASTER DMT,STERILE

## (undated) DEVICE — LABELS, OR GENERAL, W/MARKER

## (undated) DEVICE — COVER, TABLE

## (undated) DEVICE — DRAPE, LEGGINGS, 28.5 X 43 IN, DISPOSABLE, LF, STERILE

## (undated) DEVICE — SYRINGE, 30 CC, LUER LOCK

## (undated) DEVICE — SYRINGE, 35 CC, LUER LOCK, MONOJECT, LF

## (undated) DEVICE — MARKER, SKIN, REGULAR TIP, W/W/FLEXI RULER, LABEL

## (undated) DEVICE — DRESSING, NON-ADHERENT, TELFA, OUCHLESS, 3 X 8 IN, STERILE

## (undated) DEVICE — NEEDLE, SPINAL, LONG, 22 G X 5 IN, BLACK HUB

## (undated) DEVICE — GOWN, ASTOUND, XXL

## (undated) DEVICE — APPLICATOR, PREP, CHLORAPREP, W/ORANGE TINT, 10.5ML

## (undated) DEVICE — PAD, SANITARY, MAXI, U BY KOTEX, REG, LF

## (undated) DEVICE — GOWN, ASTOUND, L

## (undated) DEVICE — SYSTEM, SPACEOAR VUE, HYDROGEL

## (undated) DEVICE — TOWEL PACK, STERILE, 4/PACK, BLUE

## (undated) DEVICE — PANTY, MESH, LARGE

## (undated) DEVICE — COVER, TABLE, 44X90